# Patient Record
Sex: FEMALE | Race: WHITE | Employment: FULL TIME | URBAN - METROPOLITAN AREA
[De-identification: names, ages, dates, MRNs, and addresses within clinical notes are randomized per-mention and may not be internally consistent; named-entity substitution may affect disease eponyms.]

---

## 2024-04-11 ENCOUNTER — APPOINTMENT (OUTPATIENT)
Dept: LAB | Facility: HOSPITAL | Age: 44
End: 2024-04-11
Payer: COMMERCIAL

## 2024-04-11 ENCOUNTER — OFFICE VISIT (OUTPATIENT)
Dept: INTERNAL MEDICINE CLINIC | Facility: CLINIC | Age: 44
End: 2024-04-11
Payer: COMMERCIAL

## 2024-04-11 VITALS
BODY MASS INDEX: 31.99 KG/M2 | TEMPERATURE: 98.3 F | WEIGHT: 192 LBS | SYSTOLIC BLOOD PRESSURE: 100 MMHG | DIASTOLIC BLOOD PRESSURE: 70 MMHG | HEART RATE: 84 BPM | RESPIRATION RATE: 20 BRPM | OXYGEN SATURATION: 99 % | HEIGHT: 65 IN

## 2024-04-11 DIAGNOSIS — E66.9 CLASS 1 OBESITY WITHOUT SERIOUS COMORBIDITY WITH BODY MASS INDEX (BMI) OF 32.0 TO 32.9 IN ADULT, UNSPECIFIED OBESITY TYPE: ICD-10-CM

## 2024-04-11 DIAGNOSIS — E78.2 MIXED HYPERLIPIDEMIA: ICD-10-CM

## 2024-04-11 DIAGNOSIS — Z00.00 PHYSICAL EXAM, ANNUAL: Primary | ICD-10-CM

## 2024-04-11 DIAGNOSIS — E04.1 THYROID NODULE: ICD-10-CM

## 2024-04-11 DIAGNOSIS — R53.83 FATIGUE, UNSPECIFIED TYPE: ICD-10-CM

## 2024-04-11 DIAGNOSIS — Z12.4 SCREENING FOR CERVICAL CANCER: ICD-10-CM

## 2024-04-11 DIAGNOSIS — Z11.59 NEED FOR HEPATITIS C SCREENING TEST: ICD-10-CM

## 2024-04-11 DIAGNOSIS — R73.03 PREDIABETES: ICD-10-CM

## 2024-04-11 DIAGNOSIS — Z12.31 ENCOUNTER FOR SCREENING MAMMOGRAM FOR BREAST CANCER: ICD-10-CM

## 2024-04-11 DIAGNOSIS — Z11.4 SCREENING FOR HIV (HUMAN IMMUNODEFICIENCY VIRUS): ICD-10-CM

## 2024-04-11 DIAGNOSIS — Z00.00 ANNUAL PHYSICAL EXAM: ICD-10-CM

## 2024-04-11 PROBLEM — E66.811 CLASS 1 OBESITY WITHOUT SERIOUS COMORBIDITY WITH BODY MASS INDEX (BMI) OF 32.0 TO 32.9 IN ADULT: Status: ACTIVE | Noted: 2024-04-11

## 2024-04-11 LAB
25(OH)D3 SERPL-MCNC: 38 NG/ML (ref 30–100)
HCV AB SER QL: NORMAL
HIV 1+2 AB+HIV1 P24 AG SERPL QL IA: NORMAL
HIV 2 AB SERPL QL IA: NORMAL
HIV1 AB SERPL QL IA: NORMAL
HIV1 P24 AG SERPL QL IA: NORMAL
T4 FREE SERPL-MCNC: 0.92 NG/DL (ref 0.61–1.12)
TSH SERPL DL<=0.05 MIU/L-ACNC: 1.64 UIU/ML (ref 0.45–4.5)

## 2024-04-11 PROCEDURE — 82306 VITAMIN D 25 HYDROXY: CPT

## 2024-04-11 PROCEDURE — 84439 ASSAY OF FREE THYROXINE: CPT

## 2024-04-11 PROCEDURE — 87389 HIV-1 AG W/HIV-1&-2 AB AG IA: CPT

## 2024-04-11 PROCEDURE — 86803 HEPATITIS C AB TEST: CPT

## 2024-04-11 PROCEDURE — 84443 ASSAY THYROID STIM HORMONE: CPT

## 2024-04-11 PROCEDURE — 99386 PREV VISIT NEW AGE 40-64: CPT

## 2024-04-11 PROCEDURE — 36415 COLL VENOUS BLD VENIPUNCTURE: CPT

## 2024-04-11 RX ORDER — FENOFIBRATE 145 MG/1
145 TABLET, COATED ORAL DAILY
Qty: 30 TABLET | Refills: 1 | Status: SHIPPED | OUTPATIENT
Start: 2024-04-11 | End: 2024-06-10

## 2024-04-11 RX ORDER — FENOFIBRATE 145 MG/1
145 TABLET, COATED ORAL DAILY
COMMUNITY
End: 2024-04-11 | Stop reason: SDUPTHER

## 2024-04-11 NOTE — PROGRESS NOTES
ADULT ANNUAL PHYSICAL  [facility] INTERNAL MEDICINE [place]    NAME: Majo Frias  AGE: 43 y.o. SEX: female  : 1980     DATE: 2024     Assessment and Plan:     Problem List Items Addressed This Visit       Thyroid nodule     Patient describes remote history of right partial thyroidectomy due to large thyroid nodule.  She denies ever having to take thyroid hormone.  Previous blood work from 2023 shows normal TSH and free T4  Thyroid ultrasound 2023 shows 2 thyroid nodules with benign features, recommended repeat ultrasound in 1 year  No hypothyroid or hyperthyroid symptoms    Plan:  Will check thyroid ultrasound  Check TFTs         Relevant Orders    US thyroid    Class 1 obesity without serious comorbidity with body mass index (BMI) of 32.0 to 32.9 in adult    Relevant Medications    fenofibrate (TRICOR) 145 mg tablet    Other Relevant Orders    TSH, 3rd generation    T4, free    Ambulatory Referral to Weight Management    Vitamin D 25 hydroxy    Mixed hyperlipidemia     Last lipid panel 2023 shows elevated LDL, total cholesterol, and triglycerides  Triglycerides higher than previous labs  States that she started taking fenofibrate 6 months ago    Plan:  Will continue fenofibrate 145 mg daily for now  Repeat lipid panel at 1 year philip  Continue last modifications, weight management program         Relevant Medications    fenofibrate (TRICOR) 145 mg tablet    Prediabetes     A1c from 2023 shows 5.7, prediabetes  BMI 32  Patient wants a structured weight loss program, wants to avoid medicines    Plan:  Referred to weight management program  Recommend Meditarrenean diet         Annual physical exam     Patient here for annual physical exam and to establish care.  She is from [place] and had moved to [place], not relocating here in [place].  Previous mammograms and Pap smears have been normal according to the patient    Plan:  Ordered  mammogram  Refer to OB/GYN for Pap smear  Check HIV and hepatitis C screening labs         Physical exam, annual - Primary    Fatigue     Patient endorses having periods of fatigue, feeling down  PHQ 2 negative    Plan:  Will check TFTs and vitamin D         Relevant Orders    Vitamin D 25 hydroxy     Other Visit Diagnoses       Need for hepatitis C screening test        Relevant Orders    Hepatitis C Antibody    Screening for HIV (human immunodeficiency virus)        Relevant Orders    HIV 1/2 AG/AB w Reflex [facility] for 2 yr old and above    Screening for cervical cancer        Relevant Orders    Ambulatory Referral to Obstetrics / Gynecology    Encounter for screening mammogram for breast cancer        Relevant Orders    Mammo screening bilateral w 3d & cad            Immunizations and preventive care screenings were discussed with patient today. Appropriate education was printed on patient's after visit summary.    Counseling:  Alcohol/drug use: discussed moderation in alcohol intake, the recommendations for healthy alcohol use, and avoidance of illicit drug use.  Dental Health: discussed importance of regular tooth brushing, flossing, and dental visits.  Injury prevention: discussed safety/seat belts, safety helmets, smoke detectors, carbon dioxide detectors, and smoking near bedding or upholstery.  Sexual health: discussed sexually transmitted diseases, partner selection, use of condoms, avoidance of unintended pregnancy, and contraceptive alternatives.  Exercise: the importance of regular exercise/physical activity was discussed. Recommend exercise 3-5 times per week for at least 30 minutes.       Depression Screening and Follow-up Plan: Patient was screened for depression during today's encounter. They screened negative with a PHQ-2 score of 0.        Return in about 1 year (around 4/11/2025) for Annual physical.     Chief Complaint:     Chief Complaint   Patient presents with    [facility]     New pt       History of Present Illness:     Adult Annual Physical   Patient here for a comprehensive physical exam. The patient reports no problems.    Diet and Physical Activity  Diet/Nutrition: well balanced diet.   Exercise: 3-4 times a week on average.      Depression Screening  PHQ-2/9 Depression Screening    Little interest or pleasure in doing things: 0 - not at all  Feeling down, depressed, or hopeless: 0 - not at all  PHQ-2 Score: 0  PHQ-2 Interpretation: Negative depression screen       General Health  Sleep: gets 7-8 hours of sleep on average.   Hearing: normal - bilateral.  Vision: no vision problems.   Dental: regular dental visits.       /GYN Health  Follows with gynecology? no   Patient is: premenopausal  Last menstrual period: March 24  Contraceptive method: barrier methods.    Advanced Care Planning  Do you have an advanced directive? yes  Do you have a durable medical power of ? yes  ACP document given to the patient? no     Review of Systems:     Review of Systems   Constitutional:  Negative for chills and fever.   HENT:  Negative for ear pain and sore throat.    Eyes:  Negative for pain and visual disturbance.   Respiratory:  Negative for cough and shortness of breath.    Cardiovascular:  Negative for chest pain and palpitations.   Gastrointestinal:  Negative for abdominal pain and vomiting.   Genitourinary:  Negative for dysuria and hematuria.   Musculoskeletal:  Negative for arthralgias and back pain.   Skin:  Negative for color change and rash.   Neurological:  Negative for seizures and syncope.   All other systems reviewed and are negative.     Past Medical History:     No past medical history on file.   Past Surgical History:     No past surgical history on file.   Social History:     Social History     Socioeconomic History    Marital status: Single     Spouse name: None    Number of children: None    Years of education: None    Highest education level: None   Occupational History    None  "  Tobacco Use    Smoking status: Never     Passive exposure: Never    Smokeless tobacco: Never   Vaping Use    Vaping status: Never Used   Substance and Sexual Activity    Alcohol use: None    Drug use: None    Sexual activity: None   Other Topics Concern    None   Social History Narrative    None     Social Determinants of Health     Financial Resource Strain: Not on file   Food Insecurity: Not on file   Transportation Needs: Not on file   Physical Activity: Not on file   Stress: Not on file   Social Connections: Not on file   Intimate Partner Violence: Not on file   Housing Stability: Not on file      Family History:     No family history on file.   Current Medications:     Current Outpatient Medications   Medication Sig Dispense Refill    fenofibrate (TRICOR) 145 mg tablet Take 1 tablet (145 mg total) by mouth daily 30 tablet 1     No current facility-administered medications for this visit.      Allergies:     Allergies   Allergen Reactions    Penicillins Rash      Physical Exam:     /70 (BP Location: Right arm, Patient Position: Sitting, Cuff Size: Large)   Pulse 84   Temp 98.3 °F (36.8 °C)   Resp 20   Ht 5' 4.5\" (1.638 m)   Wt 87.1 kg (192 lb)   SpO2 99%   BMI 32.45 kg/m²     Physical Exam  Vitals and nursing note reviewed.   Constitutional:       General: She is not in acute distress.     Appearance: Normal appearance. She is well-developed. She is obese. She is not ill-appearing.   HENT:      Head: Normocephalic and atraumatic.   Eyes:      General: No scleral icterus.     Extraocular Movements: Extraocular movements intact.      Conjunctiva/sclera: Conjunctivae normal.      Pupils: Pupils are equal, round, and reactive to light.   Cardiovascular:      Rate and Rhythm: Normal rate and regular rhythm.      Pulses: Normal pulses.      Heart sounds: Normal heart sounds. No murmur heard.     No friction rub. No gallop.   Pulmonary:      Effort: Pulmonary effort is normal. No respiratory distress.    "   Breath sounds: Normal breath sounds. No wheezing or rales.   Abdominal:      General: There is no distension.      Palpations: Abdomen is soft.      Tenderness: There is no abdominal tenderness. There is no guarding.   Musculoskeletal:         General: No swelling.      Cervical back: Neck supple.      Right lower leg: No edema.      Left lower leg: No edema.   Skin:     General: Skin is warm and dry.      Capillary Refill: Capillary refill takes less than 2 seconds.   Neurological:      Mental Status: She is alert and oriented to person, place, and time. Mental status is at baseline.   Psychiatric:         Mood and Affect: Mood normal.         Behavior: Behavior normal.          Nutrition Assessment and Intervention:     Reviewed food recall journal    Recommended completion of food recall journal    Ordered nutritional assessment labs    New Nutrition Prescription completed with patient    Referral given to nutritionist or nutrition shared medical appointment    Reviewed and updated Nutrition Prescription      Other interventions: Eat 1 bowl leafy greens daily    Physical Activity Assessment and Intervention:    Activity journal reviewed    Activity journal completion recommended      Emotional and Mental Well-being, Sleep, Connectedness Assessment and Intervention:    Sleep/stress assessment performed    Depression and anxiety screening performed and reviewed    PHQ-9 or GAD7 performed for initial evaluation or follow-up      Tobacco and Toxic Substance Assessment and Intervention:     Tobacco use screening performed    Alcohol and drug use screening performed        Fransico Paez MD  [facility] INTERNAL MEDICINE [place]

## 2024-04-11 NOTE — ASSESSMENT & PLAN NOTE
Last lipid panel December 2023 shows elevated LDL, total cholesterol, and triglycerides  Triglycerides higher than previous labs  States that she started taking fenofibrate 6 months ago    Plan:  Will continue fenofibrate 145 mg daily for now  Repeat lipid panel at 1 year philip  Continue last modifications, weight management program

## 2024-04-11 NOTE — ASSESSMENT & PLAN NOTE
Patient describes remote history of right partial thyroidectomy due to large thyroid nodule.  She denies ever having to take thyroid hormone.  Previous blood work from December 2023 shows normal TSH and free T4  Thyroid ultrasound March 2023 shows 2 thyroid nodules with benign features, recommended repeat ultrasound in 1 year  No hypothyroid or hyperthyroid symptoms    Plan:  Will check thyroid ultrasound  Check TFTs

## 2024-04-11 NOTE — ASSESSMENT & PLAN NOTE
A1c from December 2023 shows 5.7, prediabetes  BMI 32  Patient wants a structured weight loss program, wants to avoid medicines    Plan:  Referred to weight management program  Recommend Meditarrenean diet

## 2024-04-11 NOTE — ASSESSMENT & PLAN NOTE
Patient here for annual physical exam and to establish care.  She is from [place] and had moved to [place], not relocating here in [place].  Previous mammograms and Pap smears have been normal according to the patient    Plan:  Ordered mammogram  Refer to OB/GYN for Pap smear  Check HIV and hepatitis C screening labs

## 2024-04-11 NOTE — ASSESSMENT & PLAN NOTE
Patient endorses having periods of fatigue, feeling down  PHQ 2 negative    Plan:  Will check TFTs and vitamin D

## 2024-04-16 ENCOUNTER — HOSPITAL ENCOUNTER (OUTPATIENT)
Dept: ULTRASOUND IMAGING | Facility: CLINIC | Age: 44
Discharge: HOME | End: 2024-04-16
Payer: COMMERCIAL

## 2024-04-16 DIAGNOSIS — E04.1 THYROID NODULE: ICD-10-CM

## 2024-04-16 PROCEDURE — 76536 US EXAM OF HEAD AND NECK: CPT

## 2024-04-17 ENCOUNTER — OFFICE VISIT (OUTPATIENT)
Dept: OBGYN CLINIC | Facility: CLINIC | Age: 44
End: 2024-04-17
Payer: COMMERCIAL

## 2024-04-17 VITALS
HEIGHT: 65 IN | WEIGHT: 191 LBS | SYSTOLIC BLOOD PRESSURE: 124 MMHG | DIASTOLIC BLOOD PRESSURE: 82 MMHG | BODY MASS INDEX: 31.82 KG/M2

## 2024-04-17 DIAGNOSIS — Z12.4 SCREENING FOR CERVICAL CANCER: ICD-10-CM

## 2024-04-17 DIAGNOSIS — Z01.419 ENCOUNTER FOR GYNECOLOGICAL EXAMINATION WITHOUT ABNORMAL FINDING: Primary | ICD-10-CM

## 2024-04-17 PROCEDURE — G0145 SCR C/V CYTO,THINLAYER,RESCR: HCPCS | Performed by: PHYSICIAN ASSISTANT

## 2024-04-17 PROCEDURE — G0476 HPV COMBO ASSAY CA SCREEN: HCPCS | Performed by: PHYSICIAN ASSISTANT

## 2024-04-17 PROCEDURE — S0610 ANNUAL GYNECOLOGICAL EXAMINA: HCPCS | Performed by: PHYSICIAN ASSISTANT

## 2024-04-17 NOTE — PROGRESS NOTES
ASSESSMENT & PLAN: Majo Frias is a 43 y.o.  with normal gynecologic exam.    1.  Routine well woman exam done today  2.  Pap and HPV:  The patient's last pap and hpv was unknown.    It was normal per patient.    Pap and cotesting was done today.    Current ASCCP Guidelines reviewed.   3.  Mammogram screening up-to-date with neck screening scheduled this month.  Colon cancer screening recommended to start age 45  4. The following were reviewed in today's visit: breast self exam, family planning choices, adequate intake of calcium and vitamin D, exercise, healthy diet, and age-appropriate recommendations regarding screenings and prevention.  5.  Patient expressed some concern regarding a shortened cycle with LMP.  This is the first time this has happened.  She states usually her cycle is about every 28 days however based on her documentation last cycle was about 21 days long.  She denies the bleeding being heavier or more painful than her baseline.  She otherwise is doing well and has no other significant symptoms.  History of partial thyroidectomy with thyroid blood work recently checked this month and was all normal.  At this time I do not feel any immediate further workup is needed.  However I would recommend she continue to track her cycles.  If she experiences any intermenstrual bleeding or more frequent and consistent bleeding or if her cycles become very erratic or heavier lengthier bleeding with her menstruation she needs to follow-up.    Patient expresses understanding and agreeable to plan.  RTO 1 year for annual exam, sooner if any problems arise in the interim or follow-up as needed regarding her menstrual cycle.    CC:  Annual Gynecologic Examination    HPI: Majo Frias is a 43 y.o.  who presents for annual gynecologic examination. She is a new patient with our office.     She has the following concerns: having LMP sooner than expected.  states she had period 3/24 which was around  the time as expected. Usually cycles about every 28 days, this one came 21 days. First time it has happened, denies prior problems of significant shorter or longer cycle. Denies heavier bleeding or more painful than normal.   Used to take OCP for contraception helped with moods.     She takes medication for hyperlipidemia.   Also hx of right partial thyroidectomy - recent thyroid US ordered by PCP w/ results pending.     Healthy diet Yes; well balanced; trying to drink more water  Vitamins Yes; glucosamine, MVI  Exercise Yes; 3 x a week    Patient's last menstrual period was 2024 (exact date).    Menses frequency: usually once a month, regular  Length of bleedin-6 days  Bleeding quality: moderate to average  Pain with menses: mild cramp  Denies intermenstrual bleeding.     Last Mammogram:  - reportedly normal. Next mammo screen 26  Last Colon Cancer Screening: N/A      Health Maintenance:      She does perform regular monthly self breast exams.  Denies breast pain, lump, skin change or nipple discharge.    She feels safe at home.     Patient Active Problem List   Diagnosis    Thyroid nodule    Class 1 obesity without serious comorbidity with body mass index (BMI) of 32.0 to 32.9 in adult    Mixed hyperlipidemia    Prediabetes    Annual physical exam    Physical exam, annual    Fatigue       Past Medical History:   Diagnosis Date    Mixed hyperlipidemia     Obesity (BMI 30.0-34.9)     Prediabetes     Thyroid nodule        Past Surgical History:   Procedure Laterality Date    THYROIDECTOMY, PARTIAL Right        Past OB/Gyn History:  OB History          0    Para   0    Term   0       0    AB   0    Living   0         SAB   0    IAB   0    Ectopic   0    Multiple   0    Live Births   0                  Pt has menstrual issues. As in HPI.  History of sexually transmitted infection: denies.  History of abnormal pap smears: denies .    Patient is not currently sexually active.  She has  not been sexually active in a year. The current method of family planning is condoms.    Family History   Problem Relation Age of Onset    Heart disease Mother     Stroke Mother     Hyperlipidemia Mother     Diabetes Mother     Heart disease Father     Stroke Father     Multiple sclerosis Sister     Breast cancer Paternal Cousin     Breast cancer Paternal Aunt        Family History of Breast/ Uterine/Ovarian/Colon Cancer:  as in HPI    Social History:  Social History     Socioeconomic History    Marital status: Single     Spouse name: Not on file    Number of children: Not on file    Years of education: Not on file    Highest education level: Not on file   Occupational History    Not on file   Tobacco Use    Smoking status: Never     Passive exposure: Never    Smokeless tobacco: Never   Vaping Use    Vaping status: Never Used   Substance and Sexual Activity    Alcohol use: Not Currently    Drug use: Never    Sexual activity: Not Currently   Other Topics Concern    Not on file   Social History Narrative    Not on file     Social Determinants of Health     Financial Resource Strain: Not on file   Food Insecurity: Not on file   Transportation Needs: Not on file   Physical Activity: Not on file   Stress: Not on file   Social Connections: Not on file   Intimate Partner Violence: Not on file   Housing Stability: Not on file     Allergies   Allergen Reactions    Penicillins Rash       Current Outpatient Medications:     fenofibrate (TRICOR) 145 mg tablet, Take 1 tablet (145 mg total) by mouth daily, Disp: 30 tablet, Rfl: 1    Review of Systems:    Review of Systems  Constitutional :no fever, feels well, no tiredness, no recent weight gain or loss  ENT: no ear ache, no loss of hearing, no nosebleeds or nasal discharge, no sore throat or hoarseness.  Cardiovascular: no complaints of slow or fast heart beat, no chest pain, no palpitations, no leg claudication or lower extremity edema.  Respiratory: no complaints of shortness  "of shortness of breath, no MORTON  Breasts:no complaints of breast pain, breast lump, or nipple discharge  Gastrointestinal: no complaints of abdominal pain, constipation, nausea, vomiting, or diarrhea or bloody stools  Genitourinary : shorter cycle with LMP (21 days), first time this has happened. no complaints of dysuria, incontinence, pelvic pain, no dysmenorrhea, vaginal discharge/itch/odor or intermenstrual vaginal bleeding Musculoskeletal: no complaints of arthralgia, no myalgia, no joint swelling or stiffness, no limb pain or swelling.  Integumentary: no complaints of skin rash or lesion, itching or dry skin  Neurological: no complaints of headache, no confusion, no numbness or tingling, no dizziness or fainting  Mental health: denies anxiety, depression or SI    Objective      /82 (BP Location: Left arm, Patient Position: Sitting, Cuff Size: Adult)   Ht 5' 4.5\" (1.638 m)   Wt 86.6 kg (191 lb)   LMP 04/14/2024 (Exact Date)   BMI 32.28 kg/m²     General:   appears stated age, cooperative, alert normal mood and affect.  BMI 32.28   Neck: normal, supple,trachea midline, no masses   Heart: regular rate and rhythm, S1, S2 normal, no murmur, click, rub or gallop   Lungs: clear to auscultation bilaterally   Breasts: normal appearance, no masses or tenderness, No nipple retraction or dimpling, No nipple discharge or bleeding, No axillary or supraclavicular adenopathy, Normal to palpation without dominant masses   Abdomen: soft, non-tender, without masses or organomegaly   Vulva: normal female genitalia, no lesions   Vagina: normal vagina, no discharge, exudate, lesion, or erythema   Urethra: normal   Cervix: Normal, no discharge. PAP done. Nontender.   Uterus: normal size, contour, position, consistency, mobility, non-tender   Adnexa: no mass, fullness, tenderness   Lymphatic palpation of lymph nodes in neck, axilla, groin and/or other locations: no lymphadenopathy or masses noted   Skin normal skin turgor and " no rashes.   Psychiatric orientation to person, place, and time: normal. mood and affect: normal

## 2024-04-18 LAB
HPV HR 12 DNA CVX QL NAA+PROBE: NEGATIVE
HPV16 DNA CVX QL NAA+PROBE: NEGATIVE
HPV18 DNA CVX QL NAA+PROBE: NEGATIVE

## 2024-04-19 ENCOUNTER — TELEPHONE (OUTPATIENT)
Dept: FAMILY MEDICINE CLINIC | Facility: CLINIC | Age: 44
End: 2024-04-19

## 2024-04-22 ENCOUNTER — HOSPITAL ENCOUNTER (OUTPATIENT)
Dept: MAMMOGRAPHY | Facility: CLINIC | Age: 44
Discharge: HOME | End: 2024-04-22
Payer: COMMERCIAL

## 2024-04-22 ENCOUNTER — OFFICE VISIT (OUTPATIENT)
Dept: INTERNAL MEDICINE CLINIC | Facility: CLINIC | Age: 44
End: 2024-04-22
Payer: COMMERCIAL

## 2024-04-22 VITALS
TEMPERATURE: 98.7 F | SYSTOLIC BLOOD PRESSURE: 117 MMHG | BODY MASS INDEX: 31.82 KG/M2 | HEIGHT: 65 IN | DIASTOLIC BLOOD PRESSURE: 67 MMHG | RESPIRATION RATE: 20 BRPM | OXYGEN SATURATION: 96 % | HEART RATE: 80 BPM | WEIGHT: 191 LBS

## 2024-04-22 DIAGNOSIS — M25.511 RIGHT SHOULDER PAIN, UNSPECIFIED CHRONICITY: Primary | ICD-10-CM

## 2024-04-22 DIAGNOSIS — E66.9 CLASS 1 OBESITY WITHOUT SERIOUS COMORBIDITY WITH BODY MASS INDEX (BMI) OF 32.0 TO 32.9 IN ADULT, UNSPECIFIED OBESITY TYPE: ICD-10-CM

## 2024-04-22 DIAGNOSIS — E78.2 MIXED HYPERLIPIDEMIA: ICD-10-CM

## 2024-04-22 DIAGNOSIS — R73.03 PREDIABETES: ICD-10-CM

## 2024-04-22 DIAGNOSIS — Z12.31 ENCOUNTER FOR SCREENING MAMMOGRAM FOR BREAST CANCER: ICD-10-CM

## 2024-04-22 PROCEDURE — 77067 SCR MAMMO BI INCL CAD: CPT

## 2024-04-22 PROCEDURE — 77063 BREAST TOMOSYNTHESIS BI: CPT

## 2024-04-22 PROCEDURE — 99213 OFFICE O/P EST LOW 20 MIN: CPT

## 2024-04-22 RX ORDER — METHOCARBAMOL 500 MG/1
500 TABLET, FILM COATED ORAL 4 TIMES DAILY
Qty: 56 TABLET | Refills: 0 | Status: SHIPPED | OUTPATIENT
Start: 2024-04-22 | End: 2024-05-06

## 2024-04-22 NOTE — PROGRESS NOTES
"Name: Majo Frias      : 1980      MRN: [id number]  Encounter Provider: Jose Blake MD  Encounter Date: 2024   Encounter department: [facility] INTERNAL MEDICINE [place]    Assessment & Plan     1. Mixed hyperlipidemia  Assessment & Plan:  No results found for: \"CHOLESTEROL\", \"TRIG\", \"HDL\", \"LDLCALC\"  Previous note listed LIPID PANEL DEDC23 having elevated LDL, total cholesterol, and triglycerides   Patient currently taking fenofibrate 145mg PO Daily     Plan:  Continue fenofibrate 145mg PO Daily  Continue lifestyle modification   Repeat lipid panel  as not on file to help calculate ASCVD risk and make appropriate decision on satatin therapy         2. Right shoulder pain, unspecified chronicity    3. Class 1 obesity without serious comorbidity with body mass index (BMI) of 32.0 to 32.9 in adult, unspecified obesity type  Assessment & Plan:  Lab Results   Component Value Date    [id number] 1.644 2024    FREET4 0.92 2024     Vit d wnl  Reviewed health labs associated with obesity ordered at previous visit  PLAN  Life style modifications  Eat real food, not too much, mostly plants  Avoid processed foods  DASH Diet  Limit salt intake: education given on how to read nutriton labels to find salt (ie listed as Salt, NaCl, Sodium). Pt was educated on types of foods and drinks with high salt content.   Do not drink your calories  Eat a piece of fruit or vegetable 30 mins prior to meals  Avoid sugar and sugar substitutes  Limit meat products  Per AHA guidelines, recommend moderate-vigorous intensity exercise for 30 minutes a day for 5 days a week or a total of 150 min/week  Keep exercise journal  Small changes in activity makes a big difference ie take stairs instead of elevators or park farther away from you destination  Try to weigh yourself daily at same time of day and keep journal  Keep food journal   Sleep hygine  Mindful meditation for 15mins a day 5 days a week. Insight " "timer a good free júnior.         4. Prediabetes  Assessment & Plan:  DEC23 A1c 5.7  Pt previously inquired about weight loss program and was referred to weight management program ot previous visit  PLAN:  Continue with weight management program  As above in obesity section               Subjective      Pt presented to the clinic due to right shoulder pain.   Pt notes pain started 01MAR24. Pt was lifting weights and suddenly couldn't lift the weights completely. Then patient was taking a shower and felt that they could not lift the shoulder at all.  Pt notes since then pain has been present for a month and a half now with no resolution. Pt also notes she can't hold her phone at work and type (phone on shoulder with head bent.   Pt notes the problem is with abduction and extension.  Pt notes during a massage it hurt her right shoulder.   Pain is alleviated by NSIADS and aggravated  by weights, holding phon on shoulder, and massage  Pain is 4 out of 10 when on Asprin and 8/10 without Asprin   Pain described as sharp.           Review of Systems   Constitutional:  Negative for fever.   HENT: Negative.     Eyes: Negative.    Respiratory: Negative.     Cardiovascular: Negative.    Gastrointestinal: Negative.    Genitourinary: Negative.    Musculoskeletal:  Positive for myalgias.   Skin: Negative.    Neurological:  Negative for numbness.       Current Outpatient Medications on File Prior to Visit   Medication Sig    fenofibrate (TRICOR) 145 mg tablet Take 1 tablet (145 mg total) by mouth daily       Objective     /67 (BP Location: Left arm, Patient Position: Sitting, Cuff Size: Large)   Pulse 80   Temp 98.7 °F (37.1 °C)   Resp 20   Ht 5' 5\" (1.651 m)   Wt 86.6 kg (191 lb)   LMP 04/14/2024 (Exact Date)   SpO2 96%   BMI 31.78 kg/m²     Physical Exam  Constitutional:       Appearance: Normal appearance.   HENT:      Head: Normocephalic.      Mouth/Throat:      Mouth: Mucous membranes are moist.      Pharynx: " Oropharynx is clear.   Eyes:      Conjunctiva/sclera: Conjunctivae normal.   Cardiovascular:      Rate and Rhythm: Normal rate and regular rhythm.   Pulmonary:      Effort: Pulmonary effort is normal. No respiratory distress.      Breath sounds: Normal breath sounds. No wheezing, rhonchi or rales.   Musculoskeletal:         General: Tenderness present.      Cervical back: Normal range of motion and neck supple. No rigidity or tenderness.      Comments: Right shoulder tenderness on palpation of the upper trapezius   No pain on passive motion  Pain on active shoulder extension  No pain on shoulder extension against resistance.    Lymphadenopathy:      Cervical: No cervical adenopathy.   Neurological:      Mental Status: She is alert.     Nutrition Assessment and Intervention:     Reviewed and updated Nutrition Prescription      Physical Activity Assessment and Intervention:    Physical Activity Prescription completed with patient         Jose Blake MD

## 2024-04-22 NOTE — ASSESSMENT & PLAN NOTE
"No results found for: \"CHOLESTEROL\", \"TRIG\", \"HDL\", \"LDLCALC\"  Previous note listed LIPID PANEL DEDC23 having elevated LDL, total cholesterol, and triglycerides   Patient currently taking fenofibrate 145mg PO Daily     Plan:  Continue fenofibrate 145mg PO Daily  Continue lifestyle modification   Repeat lipid panel in 6 months as not on file to help calculate ASCVD risk and make appropriate decision on satatin therapy     "

## 2024-04-22 NOTE — ASSESSMENT & PLAN NOTE
DEC23 A1c 5.7  Pt previously inquired about weight loss program and was referred to weight management program ot previous visit  PLAN:  Continue with weight management program  As above in obesity section

## 2024-04-22 NOTE — ASSESSMENT & PLAN NOTE
Lab Results   Component Value Date    [id number] 1.644 04/11/2024    FREET4 0.92 04/11/2024     Vit d wnl  Reviewed health labs associated with obesity ordered at previous visit  PLAN  Life style modifications  Eat real food, not too much, mostly plants  Avoid processed foods  DASH Diet  Limit salt intake: education given on how to read nutriton labels to find salt (ie listed as Salt, NaCl, Sodium). Pt was educated on types of foods and drinks with high salt content.   Do not drink your calories  Eat a piece of fruit or vegetable 30 mins prior to meals  Avoid sugar and sugar substitutes  Limit meat products  Per AHA guidelines, recommend moderate-vigorous intensity exercise for 30 minutes a day for 5 days a week or a total of 150 min/week  Keep exercise journal  Small changes in activity makes a big difference ie take stairs instead of elevators or park farther away from you destination  Try to weigh yourself daily at same time of day and keep journal  Keep food journal   Sleep hygine  Mindful meditation for 15mins a day 5 days a week. Insight timer a good free júnior.

## 2024-04-24 ENCOUNTER — TELEPHONE (OUTPATIENT)
Age: 44
End: 2024-04-24

## 2024-04-24 LAB
LAB AP GYN PRIMARY INTERPRETATION: NORMAL
Lab: NORMAL

## 2024-04-24 NOTE — TELEPHONE ENCOUNTER
Tiana, from [employer], called stating that the pts MRI order is not approved and pending denial.    Please call [phone] d/t additional information being needed.

## 2024-04-30 DIAGNOSIS — M25.511 RIGHT SHOULDER PAIN, UNSPECIFIED CHRONICITY: Primary | ICD-10-CM

## 2024-04-30 NOTE — TELEPHONE ENCOUNTER
I left message regarding MRI cancellation and needed schedule for PT. Patient is to call back and let us know if she scheduled with physical therapy, did she need assist to schedule and notify of regular x-ray pending.

## 2024-05-01 NOTE — TELEPHONE ENCOUNTER
Carl from [facility] [employer] calling regarding MRI denial.    Patient does not meet criteria to have MRI performed.     May contact for a peer to peer at [phone].    Please review.  Thank You.

## 2024-06-20 DIAGNOSIS — E66.9 CLASS 1 OBESITY WITHOUT SERIOUS COMORBIDITY WITH BODY MASS INDEX (BMI) OF 32.0 TO 32.9 IN ADULT, UNSPECIFIED OBESITY TYPE: ICD-10-CM

## 2024-06-20 RX ORDER — FENOFIBRATE 145 MG/1
145 TABLET, COATED ORAL DAILY
Qty: 30 TABLET | Refills: 1 | Status: SHIPPED | OUTPATIENT
Start: 2024-06-20 | End: 2024-08-19

## 2024-07-02 ENCOUNTER — OFFICE VISIT (OUTPATIENT)
Dept: BARIATRICS | Facility: CLINIC | Age: 44
End: 2024-07-02
Payer: COMMERCIAL

## 2024-07-02 VITALS
SYSTOLIC BLOOD PRESSURE: 100 MMHG | BODY MASS INDEX: 31.75 KG/M2 | HEIGHT: 65 IN | WEIGHT: 190.6 LBS | DIASTOLIC BLOOD PRESSURE: 64 MMHG | HEART RATE: 74 BPM

## 2024-07-02 DIAGNOSIS — E04.1 THYROID NODULE: Primary | ICD-10-CM

## 2024-07-02 DIAGNOSIS — E66.9 CLASS 1 OBESITY WITHOUT SERIOUS COMORBIDITY WITH BODY MASS INDEX (BMI) OF 32.0 TO 32.9 IN ADULT, UNSPECIFIED OBESITY TYPE: ICD-10-CM

## 2024-07-02 DIAGNOSIS — R73.03 PREDIABETES: ICD-10-CM

## 2024-07-02 PROCEDURE — 99204 OFFICE O/P NEW MOD 45 MIN: CPT | Performed by: NURSE PRACTITIONER

## 2024-07-02 RX ORDER — MULTIVITAMIN
1 CAPSULE ORAL DAILY
COMMUNITY

## 2024-07-02 NOTE — PROGRESS NOTES
Assessment/Plan:    Class 1 obesity without serious comorbidity with body mass index (BMI) of 32.0 to 32.9 in adult  - Discussed options of HealthyCORE-Intensive Lifestyle Intervention Program, Very Low Calorie Diet-VLCD, and Conservative Program and the role of weight loss medications.  - Patient is interested in pursuing Conservative Program and follow up visits with medical weight management provider.  - Explained the importance of making lifestyle changes in addition to starting any anti-obesity medications.   - Initial weight loss goal of 5-10% weight loss for improved health. Weight loss can improve patient's co-morbid conditions and/or prevent weight-related complications.  - Weight is not at goal and patient has been unable to achieve a meaningful weight loss above 5% using various programs and tools for more than 6 months  - Labs reviewed from 4/2024    General Recommendations:  Nutrition:  Eat breakfast daily.  Do not skip meals.      Food log (ie.) www.[employer].com, sparkpeople.com, loseit.com, calorieking.com, etc.     Practice mindful eating.  Be sure to set aside time to eat, eat slowly, and savor your food.     Hydration:    At least 64oz of water daily.  No sugar sweetened beverages.  No juice (eat the fruit instead).     Exercise:  Studies have shown that the ideal exercise goal is somewhere between 150 to 300 minutes of moderate intensity exercise a week.  Start with exercising 10 minutes every other day and gradually increase physical activity with a goal of at least 150 minutes of moderate intensity exercise a week, divided over at least 3 days a week.  An example of this would be exercising 30 minutes a day, 5 days a week.  Resistance training can increase muscle mass and increase our resting metabolic rate.   FULL BODY resistance training is recommended 2-3 times a week.  Do not do this on consecutive days to allow for muscle recovery.     Aim for a bare minimum 5000 steps, even on days you  do not exercise.     Monitoring:   Weigh yourself daily.  If this causes undue stress, then just weigh yourself once a week.  Weigh yourself the same time of the day with the same amount of clothing on.  Preferably this should be done after waking up, before you eat, and with no clothing or minimal clothing on.     Specific Goals:  Patient labs reviewed and barriers to weight loss were addressed today.  Nutrition was discussed patient will start tracking her food and her calories again daily.  She will try to evenly balance her calories throughout the day to avoid eating large portions with each meal and late in the evening.  Activity was discussed and she will continue to try to work out at least 3 times a week for 30 minutes doing both strength training and cardiovascular exercise.  Medications were discussed:  Phentermine was discussed to help with appetite suppression  Topiramate was discussed and patient denies any history of kidney stones  Bupropion/naltrexone was discussed and patient denies any history of seizures  GLP-1 medications were discussed as they may help with blood sugar control as well as appetite suppression and portion control.  Patient will inquire with her insurance company if they are covered and may consider Zepbound or Wegovy.    Thyroid nodule  Thyroid labs and thyroid US was stable in 4/2024    Prediabetes  Weight loss and diet changes will likely help blood sugar control  May consider metformin or GLP-1 medication         Majo was seen today for consult.    Diagnoses and all orders for this visit:    Thyroid nodule    Class 1 obesity without serious comorbidity with body mass index (BMI) of 32.0 to 32.9 in adult, unspecified obesity type  -     Ambulatory Referral to Weight Management    Prediabetes       Patient denies personal history of pancreatitis. Patient also denies personal and family history of medullary thyroid cancer and multiple endocrine neoplasia type 2 (MEN 2 tumor).  "Patient denies any history of kidney stones, seizures, or glaucoma.       Total time spent reviewing chart, interviewing patient, examining patient, discussing plan, answering all questions, and documentin min, with >50% face-to-face time spent counseling patient on nonsurgical interventions for the treatment of excess weight. Discussed in detail nonsurgical options including intensive lifestyle intervention program, very low-calorie diet program and conservative program.  Discussed the role of weight loss medications.  Counseled patient on diet behavior and exercise modification for weight loss.    Follow up in approximately 3 months with Non-Surgical Physician/Advanced Practitioner.    Subjective:   Chief Complaint   Patient presents with    Consult     Pt is heref for MWM consult. Waist - 36\"       Patient ID: Majo Frias  is a 43 y.o. female with excess weight/obesity here to pursue weight management.  Previous notes and records have been reviewed.    Past Medical History:   Diagnosis Date    Mixed hyperlipidemia     Obesity (BMI 30.0-34.9)     Prediabetes     Thyroid nodule      Past Surgical History:   Procedure Laterality Date    THYROIDECTOMY, PARTIAL Right        HPI:  Wt Readings from Last 20 Encounters:   24 86.5 kg (190 lb 9.6 oz)   24 86.6 kg (191 lb)   24 86.6 kg (191 lb)   24 87.1 kg (192 lb)       Patient presents today to medical weight management office for consult.  Patient has been struggling with her weight for quite a few years.  She has a very hectic life working for her [employer] and moving often.  She states her weight started to increase when she had her thyroid surgery and got worse with stress throughout the years.  In the past she was able to lose weight with diet and exercise, she even went to a metabolism center and was able to lose some weight.  She seems to be only able to lose 10 to 12 pounds and her weight loss will plateau.  She is concerned as her " mother does have diabetes and she is hoping to avoid diabetes.  She has most recently been participating with the [employer] júnior and exercising 3 times a week but has not seen much weight loss.      Obesity/Excess Weight:  Severity: Mild  Onset:  last 5-7 years  Modifiers: Diet and Exercise and Commercial Weight Loss Programs-ie. Weight Watchers, Nicole Tristan, Nutrisystem, etc.  Contributing factors: Poor Food Choices, Insufficient Physical Activity, Stress/Emotional Eating, Binge Eating, Lack of knowledge of appropriate lifestyle changes, and Insufficient time to make appropriate lifestyle changes  Associated symptoms: comorbid conditions, fatigue, body image issues, decreased self esteem, increased shortness of breath, inability to do certain activities, and clothes do not fit    Diet recall:  Dairy free  B: eggs with millard and crackers and 1/2 OJ  S: fruit  L: salad with chicken salad and italian dressing OR boiled cod fish with casava OR cauliflower cheese pizza  S: nuts OR pretzels   D: chicken and rice with vegetables  S: popcorn    Hydration: soda stream, lemon water, sweetened iced tea  Alcohol: very rarely  Smoking: no  Exercise: 3 times a week - strength and cardio  Occupation: works in [employer]  Sleep: well  STOP ban8    Current weight: 190.6 lbs  Current BMI: 32.21  Current Waist Measurement: 36 in  Goal weight: 160 lbs      Mammogram: 2024    The following portions of the patient's history were reviewed and updated as appropriate: allergies, current medications, past family history, past medical history, past social history, past surgical history, and problem list.    Family History   Problem Relation Age of Onset    Heart disease Mother     Stroke Mother     Hyperlipidemia Mother     Diabetes Mother     Heart disease Father     Stroke Father     Multiple sclerosis Sister     Breast cancer Paternal Aunt     Breast cancer Paternal Cousin         Review of Systems   Constitutional:  Negative for  "fatigue.   HENT:  Negative for sore throat.    Respiratory:  Positive for shortness of breath. Negative for cough.    Cardiovascular:  Negative for chest pain, palpitations and leg swelling.   Gastrointestinal:  Negative for abdominal pain, constipation, diarrhea and nausea.   Genitourinary:  Negative for dysuria.   Musculoskeletal:  Negative for arthralgias and back pain.   Skin:  Negative for rash.   Neurological:  Negative for headaches.   Psychiatric/Behavioral:  Negative for dysphoric mood. The patient is not nervous/anxious.        Objective:  /64 (BP Location: Left arm, Patient Position: Sitting, Cuff Size: Large)   Pulse 74   Ht 5' 4.5\" (1.638 m)   Wt 86.5 kg (190 lb 9.6 oz)   LMP 06/25/2024 (Exact Date)   BMI 32.21 kg/m²     Physical Exam  Vitals and nursing note reviewed.   Constitutional:       Appearance: Normal appearance. She is obese.   HENT:      Head: Normocephalic.   Pulmonary:      Effort: Pulmonary effort is normal.   Neurological:      General: No focal deficit present.      Mental Status: She is alert and oriented to person, place, and time.   Psychiatric:         Mood and Affect: Mood normal.         Behavior: Behavior normal.         Thought Content: Thought content normal.         Judgment: Judgment normal.              Labs and Imaging  Recent labs and imaging have been personally reviewed.  No results found for: \"WBC\", \"HGB\", \"HCT\", \"MCV\", \"PLT\"  No results found for: \"NA\", \"SODIUM\", \"K\", \"CL\", \"CO2\", \"ANIONGAP\", \"AGAP\", \"BUN\", \"CREATININE\", \"GLUC\", \"GLUF\", \"CALCIUM\", \"AST\", \"ALT\", \"ALKPHOS\", \"PROT\", \"TP\", \"BILITOT\", \"TBILI\", \"EGFR\"  No results found for: \"HGBA1C\"  Lab Results   Component Value Date    [id number] 1.644 04/11/2024     No results found for: \"CHOLESTEROL\"  No results found for: \"HDL\"  No results found for: \"TRIG\"  No results found for: \"LDLCALC\"  "

## 2024-07-08 NOTE — ASSESSMENT & PLAN NOTE
- Discussed options of HealthyCORE-Intensive Lifestyle Intervention Program, Very Low Calorie Diet-VLCD, and Conservative Program and the role of weight loss medications.  - Patient is interested in pursuing Conservative Program and follow up visits with medical weight management provider.  - Explained the importance of making lifestyle changes in addition to starting any anti-obesity medications.   - Initial weight loss goal of 5-10% weight loss for improved health. Weight loss can improve patient's co-morbid conditions and/or prevent weight-related complications.  - Weight is not at goal and patient has been unable to achieve a meaningful weight loss above 5% using various programs and tools for more than 6 months  - Labs reviewed from 4/2024    General Recommendations:  Nutrition:  Eat breakfast daily.  Do not skip meals.      Food log (ie.) www.myfitnesspal.com, sparkpeople.com, loseit.com, calorieking.com, etc.     Practice mindful eating.  Be sure to set aside time to eat, eat slowly, and savor your food.     Hydration:    At least 64oz of water daily.  No sugar sweetened beverages.  No juice (eat the fruit instead).     Exercise:  Studies have shown that the ideal exercise goal is somewhere between 150 to 300 minutes of moderate intensity exercise a week.  Start with exercising 10 minutes every other day and gradually increase physical activity with a goal of at least 150 minutes of moderate intensity exercise a week, divided over at least 3 days a week.  An example of this would be exercising 30 minutes a day, 5 days a week.  Resistance training can increase muscle mass and increase our resting metabolic rate.   FULL BODY resistance training is recommended 2-3 times a week.  Do not do this on consecutive days to allow for muscle recovery.     Aim for a bare minimum 5000 steps, even on days you do not exercise.     Monitoring:   Weigh yourself daily.  If this causes undue stress, then just weigh yourself once  a week.  Weigh yourself the same time of the day with the same amount of clothing on.  Preferably this should be done after waking up, before you eat, and with no clothing or minimal clothing on.     Specific Goals:  Patient labs reviewed and barriers to weight loss were addressed today.  Nutrition was discussed patient will start tracking her food and her calories again daily.  She will try to evenly balance her calories throughout the day to avoid eating large portions with each meal and late in the evening.  Activity was discussed and she will continue to try to work out at least 3 times a week for 30 minutes doing both strength training and cardiovascular exercise.  Medications were discussed:  Phentermine was discussed to help with appetite suppression  Topiramate was discussed and patient denies any history of kidney stones  Bupropion/naltrexone was discussed and patient denies any history of seizures  GLP-1 medications were discussed as they may help with blood sugar control as well as appetite suppression and portion control.  Patient will inquire with her insurance company if they are covered and may consider Zepbound or Wegovy.

## 2024-07-08 NOTE — ASSESSMENT & PLAN NOTE
Weight loss and diet changes will likely help blood sugar control  May consider metformin or GLP-1 medication

## 2024-07-23 ENCOUNTER — HOSPITAL ENCOUNTER (OUTPATIENT)
Dept: RADIOLOGY | Facility: HOSPITAL | Age: 44
Discharge: HOME | End: 2024-07-23
Payer: COMMERCIAL

## 2024-07-23 ENCOUNTER — APPOINTMENT (OUTPATIENT)
Dept: LAB | Facility: HOSPITAL | Age: 44
End: 2024-07-23
Payer: COMMERCIAL

## 2024-07-23 ENCOUNTER — TELEPHONE (OUTPATIENT)
Age: 44
End: 2024-07-23

## 2024-07-23 ENCOUNTER — OFFICE VISIT (OUTPATIENT)
Dept: INTERNAL MEDICINE CLINIC | Facility: CLINIC | Age: 44
End: 2024-07-23

## 2024-07-23 VITALS
OXYGEN SATURATION: 98 % | BODY MASS INDEX: 32.61 KG/M2 | SYSTOLIC BLOOD PRESSURE: 108 MMHG | RESPIRATION RATE: 14 BRPM | WEIGHT: 191 LBS | DIASTOLIC BLOOD PRESSURE: 60 MMHG | HEIGHT: 64 IN | TEMPERATURE: 98.1 F | HEART RATE: 80 BPM

## 2024-07-23 DIAGNOSIS — M25.511 RIGHT SHOULDER PAIN, UNSPECIFIED CHRONICITY: ICD-10-CM

## 2024-07-23 DIAGNOSIS — E78.2 MIXED HYPERLIPIDEMIA: Primary | ICD-10-CM

## 2024-07-23 DIAGNOSIS — E78.2 MIXED HYPERLIPIDEMIA: ICD-10-CM

## 2024-07-23 LAB
CHOLEST SERPL-MCNC: 216 MG/DL
HDLC SERPL-MCNC: 57 MG/DL
LDLC SERPL CALC-MCNC: 145 MG/DL (ref 0–100)
TRIGL SERPL-MCNC: 68 MG/DL

## 2024-07-23 PROCEDURE — 80061 LIPID PANEL: CPT

## 2024-07-23 PROCEDURE — 36415 COLL VENOUS BLD VENIPUNCTURE: CPT

## 2024-07-23 PROCEDURE — 73030 X-RAY EXAM OF SHOULDER: CPT

## 2024-07-23 NOTE — ASSESSMENT & PLAN NOTE
Patient presenting with continued right shoulder pain worse with abduction, internal rotation.  This is limiting her ability to exercise.  Worse with resistance training.  She was unable to see orthopedic surgery because she needs a referral and was unable to do MRI because it was denied by insurance  She states that NSAIDs relieve her pain    Plan:  Will pursue right shoulder x-ray  Referral to physical therapy  Referral to orthopedic surgery  If x-ray unremarkable, and patient tries physical therapy for 6 sessions with no improvement, will pursue MRI of the right shoulder

## 2024-07-23 NOTE — PROGRESS NOTES
INTERNAL MEDICINE FOLLOW-UP OFFICE VISIT  [facility] INTERNAL MEDICINE [place]    NAME: Majo Frias  AGE: 43 y.o. SEX: female  : 1980     DATE: 2024     Assessment and Plan:     1. Mixed hyperlipidemia  Assessment & Plan:  Patient has been on fenofibrate 145 mg daily  Last lipid panel 2023 showing elevated triglycerides  We will recheck another lipid panel to determine need for increasing fenofibrate dosage versus starting statin therapy  Orders:  -     Lipid Panel with Direct LDL reflex; Future  2. Right shoulder pain, unspecified chronicity  Assessment & Plan:  Patient presenting with continued right shoulder pain worse with abduction, internal rotation.  This is limiting her ability to exercise.  Worse with resistance training.  She was unable to see orthopedic surgery because she needs a referral and was unable to do MRI because it was denied by insurance  She states that NSAIDs relieve her pain    Plan:  Will pursue right shoulder x-ray  Referral to physical therapy  Referral to orthopedic surgery  If x-ray unremarkable, and patient tries physical therapy for 6 sessions with no improvement, will pursue MRI of the right shoulder  Orders:  -     XR shoulder 2+ vw right; Future; Expected date: 2024  -     Ambulatory Referral to Orthopedic Surgery; Future  -     Ambulatory Referral to Physical Therapy; Future      Return in about 3 months (around 10/23/2024).     Chief Complaint:     Chief Complaint   Patient presents with    Shoulder Pain     Several months        History of Present Illness:     Majo Frias is a 43-year-old female with past medical history of prediabetes, hyperlipidemia, and thyroid nodule presented to the clinic for follow-up.  Patient states that she has been having right shoulder pain since 2024, which is worse with exertion, abduction, internal rotation.  She has been trying to exercise in order to lose more weight but is  "limited by her right shoulder pain.  She states that she tried to go see an orthopedic surgeon but was told to go to her PCP first.  At the last visit she was ordered an MRI of the right shoulder but it looks like it was denied by her insurance.  She denies any other complaints.    The following portions of the patient's history were reviewed and updated as appropriate: allergies, current medications, past family history, past medical history, past social history, past surgical history and problem list.     Review of Systems:     Review of Systems   Constitutional:  Negative for chills and fever.   HENT:  Negative for ear pain and sore throat.    Eyes:  Negative for pain and visual disturbance.   Respiratory:  Negative for cough and shortness of breath.    Cardiovascular:  Negative for chest pain and palpitations.   Gastrointestinal:  Negative for abdominal pain and vomiting.   Genitourinary:  Negative for dysuria and hematuria.   Musculoskeletal:  Positive for arthralgias. Negative for back pain.   Skin:  Negative for color change and rash.   Neurological:  Negative for seizures and syncope.   All other systems reviewed and are negative.       Problem List:     Patient Active Problem List   Diagnosis    Thyroid nodule    Class 1 obesity without serious comorbidity with body mass index (BMI) of 32.0 to 32.9 in adult    Mixed hyperlipidemia    Prediabetes    Annual physical exam    Physical exam, annual    Fatigue    Right shoulder pain        Objective:     /60 (BP Location: Left arm, Patient Position: Sitting, Cuff Size: Large)   Pulse 80   Temp 98.1 °F (36.7 °C) (Tympanic)   Resp 14   Ht 5' 4\" (1.626 m)   Wt 86.6 kg (191 lb)   LMP 06/25/2024 (Exact Date)   SpO2 98%   BMI 32.79 kg/m²     Physical Exam  Vitals and nursing note reviewed.   Constitutional:       General: She is not in acute distress.     Appearance: Normal appearance. She is well-developed. She is not ill-appearing.   HENT:      Head: " "Normocephalic and atraumatic.   Eyes:      General: No scleral icterus.     Extraocular Movements: Extraocular movements intact.      Conjunctiva/sclera: Conjunctivae normal.      Pupils: Pupils are equal, round, and reactive to light.   Cardiovascular:      Rate and Rhythm: Normal rate and regular rhythm.      Pulses: Normal pulses.      Heart sounds: Normal heart sounds. No murmur heard.     No friction rub. No gallop.   Pulmonary:      Effort: Pulmonary effort is normal. No respiratory distress.      Breath sounds: Normal breath sounds. No wheezing or rales.   Abdominal:      General: There is no distension.      Palpations: Abdomen is soft.      Tenderness: There is no abdominal tenderness. There is no guarding.   Musculoskeletal:         General: Tenderness (right shoulder, reproducible at the acromio-clavicular joint with ABduction, internal rotation.) present. No swelling.      Cervical back: Neck supple.      Right lower leg: No edema.      Left lower leg: No edema.   Skin:     General: Skin is warm and dry.      Capillary Refill: Capillary refill takes less than 2 seconds.   Neurological:      Mental Status: She is alert and oriented to person, place, and time.   Psychiatric:         Mood and Affect: Mood normal.         Behavior: Behavior normal.         Pertinent Laboratory/Diagnostic Studies:    Laboratory Results: I have personally reviewed the pertinent laboratory results/reports     CBC: No results for input(s): \"WBC\", \"RBC\", \"HGB\", \"HCT\", \"MCV\", \"MCH\", \"MCHC\", \"RDW\", \"MPV\", \"PLT\", \"NRBC\", \"NEUTOPHILPCT\", \"LYMPHOPCT\", \"MONOPCT\", \"EOSPCT\", \"BASOPCT\", \"NEUTROABS\", \"LYMPHSABS\", \"MONOSABS\", \"EOSABS\" in the last 8784 hours.  Chemistry Profile: No results for input(s): \"NA\", \"K\", \"CL\", \"CO2\", \"ANIONGAP\", \"BUN\", \"CREATININE\", \"GLUF\", \"GLUC\", \"GLUCOSE\", \"CALCIUM\", \"CORRECTEDCA\", \"MG\", \"PHOS\", \"AST\", \"ALT\", \"ALKPHOS\", \"PROT\", \"BILITOT\", \"EGFR\", \"GFRAA\", \"GFRNONAA\", \"EGFRAA\", \"EGFRNAA\", \"EGFRNONAFA\" in the last " "8784 hours.    Invalid input(s): \"EXTSODIUM\", \"EXTPOTASSIUM\", \"EXTCO2\", \"EXTANIONGAP\", \"EXTBUN\", \"EXTCREAT\", \"EXTGLUBLD\", \"GLU\", \"SLAMBGLUCOSE\", \"EXTCALCIUM\", \"CAADJUST\", \"ALBUMIN\", \"SERUMALBUMIN\", \"EXTEGFR\"    Radiology/Other Diagnostic Testing Results: I have personally reviewed pertinent reports.      Nutrition Assessment and Intervention:     Reviewed food recall journal    Recommended completion of food recall journal    Ordered nutritional assessment labs      Physical Activity Assessment and Intervention:    Activity journal reviewed    Activity journal completion recommended    Exercise capacity assessment recommended    Referral given to exercise professional      Emotional and Mental Well-being, Sleep, Connectedness Assessment and Intervention:    Sleep/stress assessment performed    Depression and anxiety screening performed and reviewed    PHQ-9 or GAD7 performed for initial evaluation or follow-up      Tobacco and Toxic Substance Assessment and Intervention:     Tobacco use screening performed    Alcohol and drug use screening performed        Fransico Paez MD  [facility] INTERNAL MEDICINE [facility]"

## 2024-07-23 NOTE — ASSESSMENT & PLAN NOTE
Patient has been on fenofibrate 145 mg daily  Last lipid panel December 2023 showing elevated triglycerides  We will recheck another lipid panel to determine need for increasing fenofibrate dosage versus starting statin therapy

## 2024-07-23 NOTE — TELEPHONE ENCOUNTER
Please be advised that patients lipid panel has come in, she would like the providers recommendation of starting a statin or increasing the fenofibrate now instead of waiting for 3 month follow up.  Please contact patient after provider recommendation

## 2024-07-25 ENCOUNTER — TELEPHONE (OUTPATIENT)
Dept: INTERNAL MEDICINE CLINIC | Facility: CLINIC | Age: 44
End: 2024-07-25

## 2024-08-18 DIAGNOSIS — E66.9 CLASS 1 OBESITY WITHOUT SERIOUS COMORBIDITY WITH BODY MASS INDEX (BMI) OF 32.0 TO 32.9 IN ADULT, UNSPECIFIED OBESITY TYPE: ICD-10-CM

## 2024-08-19 ENCOUNTER — OFFICE VISIT (OUTPATIENT)
Dept: OBGYN CLINIC | Facility: MEDICAL CENTER | Age: 44
End: 2024-08-19
Payer: COMMERCIAL

## 2024-08-19 VITALS
DIASTOLIC BLOOD PRESSURE: 82 MMHG | HEIGHT: 64 IN | BODY MASS INDEX: 32.61 KG/M2 | WEIGHT: 191 LBS | HEART RATE: 75 BPM | SYSTOLIC BLOOD PRESSURE: 124 MMHG

## 2024-08-19 DIAGNOSIS — M77.8 TENDINITIS OF RIGHT SHOULDER: ICD-10-CM

## 2024-08-19 DIAGNOSIS — M25.511 ACUTE PAIN OF RIGHT SHOULDER: Primary | ICD-10-CM

## 2024-08-19 PROCEDURE — 99244 OFF/OP CNSLTJ NEW/EST MOD 40: CPT | Performed by: ORTHOPAEDIC SURGERY

## 2024-08-19 NOTE — PROGRESS NOTES
[facility] [place]  [address]  [facility] [address]       Majo Frias  [id number]  1980    ORTHOPAEDIC SURGERY OUTPATIENT NOTE  8/19/2024      HISTORY:  43 y.o. female  who presents for initial evaluation of right shoulder pain. RHD. Pain started approximately 8 months ago with out injury or trauma. Pain is localized to the posterior shoulder and upper trapezius region. Pain does not radiate. She has modified her work out routine and tried OTC pain medication with benefit but notes some continued aching pain. Pain is most aggravated with lifting and pulling with RUE. She denies limitations in range of motion or weakness. Denies numbness or paresthesias.         Past Medical History:   Diagnosis Date    Mixed hyperlipidemia     Obesity (BMI 30.0-34.9)     Prediabetes     Thyroid nodule        Past Surgical History:   Procedure Laterality Date    THYROIDECTOMY, PARTIAL Right        Social History     Socioeconomic History    Marital status: Single     Spouse name: Not on file    Number of children: Not on file    Years of education: Not on file    Highest education level: Not on file   Occupational History    Not on file   Tobacco Use    Smoking status: Never     Passive exposure: Never    Smokeless tobacco: Never   Vaping Use    Vaping status: Never Used   Substance and Sexual Activity    Alcohol use: Not Currently    Drug use: Never    Sexual activity: Not Currently   Other Topics Concern    Not on file   Social History Narrative    Not on file     Social Determinants of Health     Financial Resource Strain: Not on file   Food Insecurity: Not on file   Transportation Needs: Not on file   Physical Activity: Not on file   Stress: Not on file   Social Connections: Not on file   Intimate Partner Violence: Not on file   Housing Stability: Not on file       Family History   Problem Relation Age of Onset    Heart disease Mother     Stroke Mother      "Hyperlipidemia Mother     Diabetes Mother     Heart disease Father     Stroke Father     Multiple sclerosis Sister     Breast cancer Paternal Aunt     Breast cancer Paternal Cousin         Patient's Medications   New Prescriptions    No medications on file   Previous Medications    FENOFIBRATE (TRICOR) 145 MG TABLET    Take 1 tablet (145 mg total) by mouth daily    GLUCOSAMINE-CHONDROITIN 500-400 MG TABLET    Take 1 tablet by mouth 3 (three) times a day    MULTIPLE VITAMIN (MULTIVITAMIN) CAPSULE    Take 1 capsule by mouth daily   Modified Medications    No medications on file   Discontinued Medications    No medications on file       Allergies   Allergen Reactions    Penicillins Rash        /82 (BP Location: Left arm, Patient Position: Sitting, Cuff Size: Standard)   Pulse 75   Ht 5' 4\" (1.626 m)   Wt 86.6 kg (191 lb)   BMI 32.79 kg/m²      REVIEW OF SYSTEMS:  Constitutional: Negative.    HEENT: Negative.    Respiratory: Negative.    Skin: Negative.    Neurological: Negative.    Psychiatric/Behavioral: Negative.  Musculoskeletal: Negative except for that mentioned in the HPI.    PHYSICAL EXAM:      RIGHT SHOULDER:    Appearance:   Skin is clean, dry, and intact  No ecchymosis, edema, or erythema noted     Forward flexion:   180 degrees   Abduction:  180 degrees   External rotation at 90 degrees abduction:   90 degrees   Internal rotation at 90 degrees abduction:  90 degrees   External rotation at 0 degrees:   70 degrees   Internal rotation: T7     STRENGTH:  Forward flexion:  5/5   Abduction:  5/5   External rotation:  5/5   Internal rotation:  5/5        Speed test: Negative  Yergason's: Negative   Tender to palpation ACJ (acromioclavicular joint): Negative   Tender to palpation LHB (long head of biceps): Negative  Jacob test: Negative  Bradley test: Negative  Hornblower's: Negative  Lift off: Negative  Belly press: Negative  Bear hug: Negative  External lag sign: Negative  Cross-body adduction: " Negative  Sulcus sign: Negative  Windy's test: Negative  Drop arm test: negative    Radial/median/ulnar nerve intact    <2 sec cap refill      IMAGING:  x-rays of the right shoulder obtained 7/23/24 demonstrate no acute osseous abnormalities.     ASSESSMENT AND PLAN:  43 y.o. female with atraumatic right shoulder pain. Upon review of the right shoulder x-ray, a thorough history and my examination, Majo is presenting with signs and symptoms consistent with tendonitis. Etiology and treatment options discussed, all her questions were addressed. Patient has good range of motion and strength on clinical exam today. She may return to activities as tolerated. Continue with OTC NSAIDs as needed for pain. Follow up if symptoms worsen or fail to improve.       Scribe Attestation      I,:  Kaylie Jacques am acting as a scribe while in the presence of the attending physician.:       I,:  Lizbet Delgado, DO personally performed the services described in this documentation    as scribed in my presence.:

## 2024-08-21 RX ORDER — FENOFIBRATE 145 MG/1
145 TABLET, COATED ORAL DAILY
Qty: 30 TABLET | Refills: 0 | Status: SHIPPED | OUTPATIENT
Start: 2024-08-21 | End: 2024-08-27 | Stop reason: SDUPTHER

## 2024-11-19 ENCOUNTER — OFFICE VISIT (OUTPATIENT)
Dept: DERMATOLOGY | Facility: CLINIC | Age: 44
End: 2024-11-19
Payer: COMMERCIAL

## 2024-11-19 VITALS — BODY MASS INDEX: 33.47 KG/M2 | TEMPERATURE: 96.3 F | WEIGHT: 195 LBS

## 2024-11-19 DIAGNOSIS — L85.3 XEROSIS OF SKIN: ICD-10-CM

## 2024-11-19 DIAGNOSIS — L91.8 SKIN TAGS, MULTIPLE ACQUIRED: Primary | ICD-10-CM

## 2024-11-19 DIAGNOSIS — D22.62 MULTIPLE BENIGN MELANOCYTIC NEVI OF UPPER AND LOWER EXTREMITIES AND TRUNK: ICD-10-CM

## 2024-11-19 DIAGNOSIS — D22.5 MULTIPLE BENIGN MELANOCYTIC NEVI OF UPPER AND LOWER EXTREMITIES AND TRUNK: ICD-10-CM

## 2024-11-19 DIAGNOSIS — D22.61 MULTIPLE BENIGN MELANOCYTIC NEVI OF UPPER AND LOWER EXTREMITIES AND TRUNK: ICD-10-CM

## 2024-11-19 DIAGNOSIS — D22.71 MULTIPLE BENIGN MELANOCYTIC NEVI OF UPPER AND LOWER EXTREMITIES AND TRUNK: ICD-10-CM

## 2024-11-19 DIAGNOSIS — L73.9 FOLLICULITIS: ICD-10-CM

## 2024-11-19 DIAGNOSIS — D22.72 MULTIPLE BENIGN MELANOCYTIC NEVI OF UPPER AND LOWER EXTREMITIES AND TRUNK: ICD-10-CM

## 2024-11-19 PROCEDURE — 99203 OFFICE O/P NEW LOW 30 MIN: CPT | Performed by: DERMATOLOGY

## 2024-11-19 RX ORDER — OMEGA-3/DHA/EPA/FISH OIL 60 MG-90MG
500 CAPSULE ORAL DAILY
COMMUNITY

## 2024-11-19 NOTE — PROGRESS NOTES
"[facility] Note     Patient Name: Majo Frias  Encounter Date: 11/19/24     Have you been cared for by a [facility] Dermatologist in the last 3 years and, if so, which description applies to you?    NO.   I am considered a \"new\" patient and must complete all patient intake questions. I am FEMALE/of child-bearing potential.    REVIEW OF SYSTEMS:  Have you recently had or currently have any of the following? Recent fever or chills? No  Any non-healing wound? No  Are you pregnant or planning to become pregnant? No  Are you currently or planning to be nursing or breast feeding? No   PAST MEDICAL HISTORY:  Have you personally ever had or currently have any of the following?  If \"YES,\" then please provide more detail. Skin cancer (such as Melanoma, Basal Cell Carcinoma, Squamous Cell Carcinoma?  No  Tuberculosis, HIV/AIDS, Hepatitis B or C: No  Radiation Treatment No   HISTORY OF IMMUNOSUPPRESSION:   Do you have a history of any of the following:  Systemic Immunosuppression such as Diabetes, Biologic or Immunotherapy, Chemotherapy, Organ Transplantation, Bone Marrow Transplantation or Prednsione?  No    Answering \"YES\" requires the addition of the dotphrase \"IMMUNOSUPPRESSED\" as the first diagnosis of the patient's visit.   FAMILY HISTORY:  Any \"first degree relatives\" (parent, brother, sister, or child) with the following?    Skin Cancer, Pancreatic or Other Cancer? No   PATIENT EXPERIENCE:    Do you want the Dermatologist to perform a COMPLETE skin exam today including a clinical examination under the \"bra and underwear\" areas?  Yes  If necessary, do we have your permission to call and leave a detailed message on your Preferred Phone number that includes your specific medical information?  Yes      Allergies   Allergen Reactions    Penicillins Rash      Current Outpatient Medications:     fenofibrate (TRICOR) 145 mg tablet, Take 1 tablet (145 mg total) by mouth daily, Disp: 90 tablet, Rfl: 0    " "glucosamine-chondroitin 500-400 MG tablet, Take 1 tablet by mouth 3 (three) times a day, Disp: , Rfl:     Multiple Vitamin (multivitamin) capsule, Take 1 capsule by mouth daily, Disp: , Rfl:           Whom besides the patient is providing clinical information about today's encounter?   NO ADDITIONAL HISTORIAN (patient alone provided history)    Physical Exam and Assessment/Plan by Diagnosis:       Scalp not examined      MULTIPLE MELANOCYTIC NEVI (\"Moles\")  Physical Exam:  Anatomic Location Affected: Trunk and extremities  Morphological Description:  Scattered, round to ovoid, symmetrical-appearing, even bordered, skin colored to dark brown macules/papules  Denies pain, itch, bleeding. No treatments tried. Present for years. Present constantly; no modifying factors which make it worse or better. Denies actively changing or growing moles.      Assessment and Plan:  Based on a thorough discussion of this condition and the management approach to it (including a comprehensive discussion of the known risks, side effects and potential benefits of treatment), the patient (family) agrees to implement the following specific plan:  Reassure benign  Monitor for changes  Use sun protection.  Apply SPF 30 or higher at least three times a day.  Wear sun protecting clothing and hats.     Worrisome signs of skin malignancy discussed, questions answered. Regular self-skin check discussed. Advised to call or return to office if patient notices any spots of concern, rapidly growing/changing lesions, bleeding lesions, non-healing lesions. Advised regular SPF use.       XEROSIS (\"DRY SKIN\")  Physical Exam:  Anatomic Location Affected:  lower extremities  Morphological Description:  diffuse dryness  Pertinent Positives:  Pertinent Negatives:    Additional History of Present Condition:  Patient is using a retinol cream on the legs. She reports that her legs have been extremely dry.    Assessment and Plan:  Based on a thorough discussion " of this condition and the management approach to it (including a comprehensive discussion of the known risks, side effects and potential benefits of treatment), the patient (family) agrees to implement the following specific plan:  Use a thick, gentle moisturizer on the legs.  Use the retinol cream on the back and upper extremities, but NOT on the legs.      FOLLICULITIS  Physical Exam:  Anatomic Location Affected:  chin  Morphological Description:  excessive hair - tweezing leading to folliculitis   Pertinent Positives:  Pertinent Negatives:    Additional History of Present Condition:  patient notes that she has some hairs on the chin that bother her.    Assessment and Plan:  Based on a thorough discussion of this condition and the management approach to it (including a comprehensive discussion of the known risks, side effects and potential benefits of treatment), the patient (family) agrees to implement the following specific plan:  Recommend cosmetic laser hair removal treatment to prevent hair growth and dark patches. Will schedule at [facility]   Don't tweeze the hairs prior to laser hair treatment; only shave the area      SKIN TAGS - irritated due to rubbing against necklaces  Physical Exam:  Anatomic Location Affected:  posterior neck  Morphological Description:  tan papules    Additional History of Present Condition:  Patient has had some of these frozen in the past, but she reports that they have come back.    Assessment and Plan:  Based on a thorough discussion of this condition and the management approach to it (including a comprehensive discussion of the known risks, side effects and potential benefits of treatment), the patient (family) agrees to implement the following specific plan:    PROCEDURE # Removal of skin tags with snip removal   After a thorough discussion of treatment options and risk/benefits/alternatives (including but not limited to local pain, scarring, dyspigmentation,  persistance/recurrence of lesions), verbal and written consent were obtained and the aforementioned lesions were treated on with snip removal after anesthesia with lidocaine with 1:200,000 epinephrine. Electrocautery and aluminum chloride was used for control of minimal bleeding.   TOTAL NUMBER of  3 lesions were treated today on the ANATOMIC LOCATION: neck.              The patient tolerated the procedure well, and verbal after-care instructions were provided.        Scribe Attestation      I,:  Coty Maldonado MA am acting as a scribe while in the presence of the attending physician.:       I,:  Pelon Casanova MD personally performed the services described in this documentation    as scribed in my presence.:

## 2024-12-15 DIAGNOSIS — E66.811 CLASS 1 OBESITY WITHOUT SERIOUS COMORBIDITY WITH BODY MASS INDEX (BMI) OF 32.0 TO 32.9 IN ADULT, UNSPECIFIED OBESITY TYPE: ICD-10-CM

## 2024-12-18 RX ORDER — FENOFIBRATE 145 MG/1
145 TABLET, COATED ORAL DAILY
Qty: 90 TABLET | Refills: 0 | Status: SHIPPED | OUTPATIENT
Start: 2024-12-18

## 2025-03-04 DIAGNOSIS — E66.811 CLASS 1 OBESITY WITHOUT SERIOUS COMORBIDITY WITH BODY MASS INDEX (BMI) OF 32.0 TO 32.9 IN ADULT, UNSPECIFIED OBESITY TYPE: ICD-10-CM

## 2025-03-13 DIAGNOSIS — E78.2 MIXED HYPERLIPIDEMIA: Primary | ICD-10-CM

## 2025-03-13 RX ORDER — FENOFIBRATE 145 MG/1
145 TABLET, COATED ORAL DAILY
Qty: 30 TABLET | Refills: 0 | Status: SHIPPED | OUTPATIENT
Start: 2025-03-13 | End: 2025-03-19

## 2025-03-18 DIAGNOSIS — E66.811 CLASS 1 OBESITY WITHOUT SERIOUS COMORBIDITY WITH BODY MASS INDEX (BMI) OF 32.0 TO 32.9 IN ADULT, UNSPECIFIED OBESITY TYPE: ICD-10-CM

## 2025-03-19 RX ORDER — FENOFIBRATE 145 MG/1
145 TABLET, COATED ORAL DAILY
Qty: 90 TABLET | Refills: 0 | Status: SHIPPED | OUTPATIENT
Start: 2025-03-19

## 2025-04-01 ENCOUNTER — TELEPHONE (OUTPATIENT)
Age: 45
End: 2025-04-01

## 2025-04-01 DIAGNOSIS — Z12.31 SCREENING MAMMOGRAM, ENCOUNTER FOR: Primary | ICD-10-CM

## 2025-04-01 NOTE — TELEPHONE ENCOUNTER
Patient called, requested screening mammogram script. Pt scheduled yearly 4/21/25.     Patient inquired if Bibi would order hormone labs including thyroid, cholesterol & any other recommended. Pt mentions this was discussed at their last appt & intends to  complete prior to 4/21 appt. Okay to call or send [employer] message.

## 2025-04-02 NOTE — TELEPHONE ENCOUNTER
Typically routine screening blood work including thyroid, diabetes, cholesterol is all managed by primary care as those are not chronic conditions that we manage from a specialty GYN standpoint.  I see that her routine internal med provider has already placed orders for some blood work on 3/13 and her internal med provider had ordered thyroid blood work last year, she can reach out to them to see if screening thyroid blood work is appropriate again.  There is no routine screening hormonal labs to order from a GYN standpoint.  Typically hormonal labs are ordered in the setting of symptoms or concerns that I feel diagnostic hormonal testing is needed.  If she has further concerns we can discuss this at her visit.

## 2025-04-02 NOTE — TELEPHONE ENCOUNTER
Outgoing call to patient. Communicated half of providers response to patient prior to patient stating she was in a meeting. Requesting message to be sent to her in [facility] with provider's response. Message sent as requested.

## 2025-04-21 ENCOUNTER — ANNUAL EXAM (OUTPATIENT)
Dept: OBGYN CLINIC | Facility: CLINIC | Age: 45
End: 2025-04-21
Payer: COMMERCIAL

## 2025-04-21 VITALS
BODY MASS INDEX: 33.84 KG/M2 | WEIGHT: 198.2 LBS | HEIGHT: 64 IN | DIASTOLIC BLOOD PRESSURE: 78 MMHG | SYSTOLIC BLOOD PRESSURE: 124 MMHG

## 2025-04-21 DIAGNOSIS — E66.811 CLASS 1 OBESITY WITHOUT SERIOUS COMORBIDITY WITH BODY MASS INDEX (BMI) OF 34.0 TO 34.9 IN ADULT, UNSPECIFIED OBESITY TYPE: ICD-10-CM

## 2025-04-21 DIAGNOSIS — Z01.419 ENCOUNTER FOR GYNECOLOGICAL EXAMINATION (GENERAL) (ROUTINE) WITHOUT ABNORMAL FINDINGS: Primary | ICD-10-CM

## 2025-04-21 PROCEDURE — S0612 ANNUAL GYNECOLOGICAL EXAMINA: HCPCS | Performed by: PHYSICIAN ASSISTANT

## 2025-04-21 NOTE — PROGRESS NOTES
ASSESSMENT & PLAN: Majo Frias is a 44 y.o.  with normal gynecologic exam.    1.  Routine well woman exam done today  2.  Pap and HPV:  The patient's last pap and hpv was .    It was normal.    Pap and cotesting was not done today.    Current ASCCP Guidelines reviewed.   3.  Mammogram for  screen scheduled.  Colon cancer screen recommended to start age 45  4. The following were reviewed in today's visit: breast self exam, use and side effects of OCPs, menopause, adequate intake of calcium and vitamin D, exercise, healthy diet, and age appropriate recommendations regarding screenings and prevention.  5.  Patient had questions regarding going through menopause and hormones which were answered to her satisfaction.  She has no menopausal symptoms but is concerned about weight fluctuation, difficulty losing weight.  Encourage patient lifestyle modification, will continue Noom, may need more calorie restriction as well as incorporating moderate to higher intensity exercise to burn calories she is taking in.  Stressed importance of eating clean, small portions, well-balanced.  Also stressed importance of exercise, also incorporating weighted activity for muscle (increased muscle mass helps calorie burn).  Discussed updating thyroid BW through PCP.  Continued management for cholesterol/conditions with PCP.  Consider discussing weight loss aide injectable to facilitate weight loss (not managed through GYN).  6.discuss BC options as pt is not currently sexually active but may happen in future. Discussed still w/ regular periods, opportunity to get pregnant. Pt considered TYLER vs POP - discussed concern of TYLER use with hyperlipidemia, strong family history of stroke as well as age greater than 35.  Also absorption may be affected if patient starts injectable weight loss medication.  Discussed alternatives such as Mirena IUD which in this nulliparous female would protect the uterine lining decrease risk for  hyperplasia, as well as likely last through her time of menopause as well as likely stop menstruation.  Patient desires to consider options and will reach out if she considers.  Handout on Mirena as well as paperwork to contact insurance to check on coverage were all provided.    RTO 1 year annual exam, to schedule appointment sooner if Mirena IUD is desired.  If she would rather consider progestin only pill for now we will reach out and can discuss via phone.      CC:  Annual Gynecologic Examination    HPI: Majo Frias is a 44 y.o.  who presents for annual gynecologic examination.      She has the following concerns:  difficult losing weight. Following noom and calorie restriction < 2000 cals/day. Not exercising.   Also questions about menopause, hormones. No current menopausal sxs, just difficulty with weight.     She is under treatment for hyperlipidemia, also takes fish oil, MVI.  Monitoring thyroid due to history of partial thyroidectomy.  Prediabetes, increased BMI.  Strong family history of cardiovascular disease/stroke    Patient's last menstrual period was 2025.    Menses frequency: regular once a month  Length of bleedin-6 days  Bleeding quality: average  Pain with menses:  none  Denies irregular bleeding.     Last Mammogram: 2024  Last Colon Cancer Screening: N/A      Health Maintenance:      She does perform regular monthly self breast exams.  Denies breast pain, lump, skin change or nipple discharge.    She feels safe at home.       Patient Active Problem List   Diagnosis    Thyroid nodule    Class 1 obesity without serious comorbidity with body mass index (BMI) of 34.0 to 34.9 in adult    Mixed hyperlipidemia    Prediabetes    Annual physical exam    Physical exam, annual    Fatigue    Right shoulder pain       Past Medical History:   Diagnosis Date    Mixed hyperlipidemia     Obesity (BMI 30.0-34.9)     Prediabetes     Thyroid nodule        Past Surgical History:   Procedure  Laterality Date    THYROIDECTOMY, PARTIAL Right        Past OB/Gyn History:  OB History          0    Para   0    Term   0       0    AB   0    Living   0         SAB   0    IAB   0    Ectopic   0    Multiple   0    Live Births   0                  Pt does not have menstrual issues. .  History of sexually transmitted infection: denies.  History of abnormal pap smears: No .    Patient is not currently sexually active.       Family History   Problem Relation Age of Onset    Heart disease Mother     Stroke Mother     Hyperlipidemia Mother     Diabetes Mother     Heart disease Father     Stroke Father     Multiple sclerosis Sister     Breast cancer Paternal Aunt     Breast cancer Paternal Cousin        Family History of Breast/ Uterine/Ovarian/Colon Cancer:  as above.    Social History:  Social History     Socioeconomic History    Marital status: Single     Spouse name: Not on file    Number of children: Not on file    Years of education: Not on file    Highest education level: Not on file   Occupational History    Not on file   Tobacco Use    Smoking status: Never     Passive exposure: Never    Smokeless tobacco: Never   Vaping Use    Vaping status: Never Used   Substance and Sexual Activity    Alcohol use: Not Currently    Drug use: Never    Sexual activity: Not Currently   Other Topics Concern    Not on file   Social History Narrative    Not on file     Social Drivers of Health     Financial Resource Strain: Not on file   Food Insecurity: Not on file   Transportation Needs: Not on file   Physical Activity: Not on file   Stress: Not on file   Social Connections: Not on file   Intimate Partner Violence: Not on file   Housing Stability: Not on file     Allergies   Allergen Reactions    Penicillins Rash       Current Outpatient Medications:     fenofibrate (TRICOR) 145 mg tablet, Take 1 tablet by mouth once daily, Disp: 90 tablet, Rfl: 0    Multiple Vitamin (multivitamin) capsule, Take 1 capsule by mouth  "daily, Disp: , Rfl:     Omega-3 Fatty Acids (Fish Oil) 500 MG CAPS, Take 500 mg by mouth in the morning, Disp: , Rfl:     Review of Systems:    Review of Systems  Constitutional : weight fluctuation/difficulty losing weight. no fever, feels well, no tiredness  ENT: no ear ache, no loss of hearing, no nosebleeds or nasal discharge, no sore throat or hoarseness.  Cardiovascular: no complaints of slow or fast heart beat, no chest pain, no palpitations, no leg claudication or lower extremity edema.  Respiratory: no complaints of shortness of shortness of breath, no MORTON  Breasts:no complaints of breast pain, breast lump, or nipple discharge  Gastrointestinal: no complaints of abdominal pain, constipation, nausea, vomiting, or diarrhea or bloody stools  Genitourinary : no complaints of dysuria, incontinence, pelvic pain, no dysmenorrhea, vaginal discharge/itching/odor or abnormal vaginal bleeding.  Musculoskeletal: no complaints of arthralgia, no myalgia, no joint swelling or stiffness, no limb pain or swelling.  Integumentary: no complaints of skin rash or lesion, itching or dry skin  Neurological: no complaints of headache, no confusion, no numbness or tingling, no dizziness or fainting  Mental health: denies anxiety, depression or SI    Objective      /78 (BP Location: Left arm, Patient Position: Sitting, Cuff Size: Adult)   Ht 5' 4\" (1.626 m)   Wt 89.9 kg (198 lb 3.2 oz)   LMP 04/17/2025   BMI 34.02 kg/m²     General:   appears stated age, cooperative, alert normal mood and affect.  BMI 34   Neck: normal, supple,trachea midline, no masses   Heart: regular rate and rhythm, S1, S2 normal, no murmur, click, rub or gallop   Lungs: clear to auscultation bilaterally   Breasts: normal appearance, no masses or tenderness, No nipple retraction or dimpling, No nipple discharge or bleeding, No axillary or supraclavicular adenopathy, Normal to palpation without dominant masses   Abdomen: soft, non-tender, without masses " or organomegaly   Vulva: normal female genitalia, no lesions   Vagina: normal vagina, no discharge, exudate, lesion, or erythema   Urethra: normal   Cervix: Normal, no discharge. Nontender.   Uterus: normal size, contour, position, consistency, mobility, non-tender   Adnexa: no mass, fullness, tenderness   Lymphatic palpation of lymph nodes in neck, axilla, groin and/or other locations: no lymphadenopathy or masses noted   Skin normal skin turgor and no rashes.   Psychiatric orientation to person, place, and time: normal. mood and affect: normal

## 2025-06-02 ENCOUNTER — OFFICE VISIT (OUTPATIENT)
Dept: INTERNAL MEDICINE CLINIC | Facility: CLINIC | Age: 45
End: 2025-06-02
Payer: COMMERCIAL

## 2025-06-02 VITALS
BODY MASS INDEX: 32.49 KG/M2 | TEMPERATURE: 98.7 F | WEIGHT: 195 LBS | SYSTOLIC BLOOD PRESSURE: 120 MMHG | HEIGHT: 65 IN | DIASTOLIC BLOOD PRESSURE: 80 MMHG | HEART RATE: 87 BPM | OXYGEN SATURATION: 97 % | RESPIRATION RATE: 16 BRPM

## 2025-06-02 DIAGNOSIS — E78.2 MIXED HYPERLIPIDEMIA: ICD-10-CM

## 2025-06-02 DIAGNOSIS — E66.811 CLASS 1 OBESITY WITHOUT SERIOUS COMORBIDITY WITH BODY MASS INDEX (BMI) OF 34.0 TO 34.9 IN ADULT, UNSPECIFIED OBESITY TYPE: Primary | ICD-10-CM

## 2025-06-02 DIAGNOSIS — E04.1 THYROID NODULE: ICD-10-CM

## 2025-06-02 PROCEDURE — 99214 OFFICE O/P EST MOD 30 MIN: CPT

## 2025-06-02 RX ORDER — FLUTICASONE PROPIONATE 50 MCG
1 SPRAY, SUSPENSION (ML) NASAL DAILY
COMMUNITY

## 2025-06-03 ENCOUNTER — APPOINTMENT (OUTPATIENT)
Dept: LAB | Facility: MEDICAL CENTER | Age: 45
End: 2025-06-03
Payer: COMMERCIAL

## 2025-06-03 DIAGNOSIS — E78.2 MIXED HYPERLIPIDEMIA: ICD-10-CM

## 2025-06-03 DIAGNOSIS — E66.811 CLASS 1 OBESITY WITHOUT SERIOUS COMORBIDITY WITH BODY MASS INDEX (BMI) OF 34.0 TO 34.9 IN ADULT, UNSPECIFIED OBESITY TYPE: ICD-10-CM

## 2025-06-03 LAB
ALBUMIN SERPL BCG-MCNC: 4.1 G/DL (ref 3.5–5)
ALP SERPL-CCNC: 52 U/L (ref 34–104)
ALT SERPL W P-5'-P-CCNC: 10 U/L (ref 7–52)
ANION GAP SERPL CALCULATED.3IONS-SCNC: 9 MMOL/L (ref 4–13)
AST SERPL W P-5'-P-CCNC: 21 U/L (ref 13–39)
BASOPHILS # BLD AUTO: 0.06 THOUSANDS/ÂΜL (ref 0–0.1)
BASOPHILS NFR BLD AUTO: 1 % (ref 0–1)
BILIRUB SERPL-MCNC: 0.6 MG/DL (ref 0.2–1)
BUN SERPL-MCNC: 15 MG/DL (ref 5–25)
CALCIUM SERPL-MCNC: 8.8 MG/DL (ref 8.4–10.2)
CHLORIDE SERPL-SCNC: 103 MMOL/L (ref 96–108)
CHOLEST SERPL-MCNC: 216 MG/DL (ref ?–200)
CO2 SERPL-SCNC: 26 MMOL/L (ref 21–32)
CREAT SERPL-MCNC: 0.77 MG/DL (ref 0.6–1.3)
EOSINOPHIL # BLD AUTO: 0.17 THOUSAND/ÂΜL (ref 0–0.61)
EOSINOPHIL NFR BLD AUTO: 2 % (ref 0–6)
ERYTHROCYTE [DISTWIDTH] IN BLOOD BY AUTOMATED COUNT: 13.5 % (ref 11.6–15.1)
EST. AVERAGE GLUCOSE BLD GHB EST-MCNC: 120 MG/DL
GFR SERPL CREATININE-BSD FRML MDRD: 95 ML/MIN/1.73SQ M
GLUCOSE P FAST SERPL-MCNC: 92 MG/DL (ref 65–99)
HBA1C MFR BLD: 5.8 %
HCT VFR BLD AUTO: 43 % (ref 34.8–46.1)
HDLC SERPL-MCNC: 53 MG/DL
HGB BLD-MCNC: 13.3 G/DL (ref 11.5–15.4)
IMM GRANULOCYTES # BLD AUTO: 0.04 THOUSAND/UL (ref 0–0.2)
IMM GRANULOCYTES NFR BLD AUTO: 1 % (ref 0–2)
LDLC SERPL CALC-MCNC: 140 MG/DL (ref 0–100)
LYMPHOCYTES # BLD AUTO: 3.8 THOUSANDS/ÂΜL (ref 0.6–4.47)
LYMPHOCYTES NFR BLD AUTO: 44 % (ref 14–44)
MCH RBC QN AUTO: 27.4 PG (ref 26.8–34.3)
MCHC RBC AUTO-ENTMCNC: 30.9 G/DL (ref 31.4–37.4)
MCV RBC AUTO: 89 FL (ref 82–98)
MONOCYTES # BLD AUTO: 0.65 THOUSAND/ÂΜL (ref 0.17–1.22)
MONOCYTES NFR BLD AUTO: 7 % (ref 4–12)
NEUTROPHILS # BLD AUTO: 4.02 THOUSANDS/ÂΜL (ref 1.85–7.62)
NEUTS SEG NFR BLD AUTO: 45 % (ref 43–75)
NRBC BLD AUTO-RTO: 0 /100 WBCS
PLATELET # BLD AUTO: 385 THOUSANDS/UL (ref 149–390)
PMV BLD AUTO: 11.3 FL (ref 8.9–12.7)
POTASSIUM SERPL-SCNC: 3.8 MMOL/L (ref 3.5–5.3)
PROT SERPL-MCNC: 7.3 G/DL (ref 6.4–8.4)
RBC # BLD AUTO: 4.86 MILLION/UL (ref 3.81–5.12)
SODIUM SERPL-SCNC: 138 MMOL/L (ref 135–147)
TRIGL SERPL-MCNC: 115 MG/DL (ref ?–150)
TSH SERPL DL<=0.05 MIU/L-ACNC: 2.28 UIU/ML (ref 0.45–4.5)
WBC # BLD AUTO: 8.74 THOUSAND/UL (ref 4.31–10.16)

## 2025-06-03 PROCEDURE — 84443 ASSAY THYROID STIM HORMONE: CPT

## 2025-06-03 PROCEDURE — 80053 COMPREHEN METABOLIC PANEL: CPT

## 2025-06-03 PROCEDURE — 85025 COMPLETE CBC W/AUTO DIFF WBC: CPT

## 2025-06-03 PROCEDURE — 83036 HEMOGLOBIN GLYCOSYLATED A1C: CPT

## 2025-06-03 PROCEDURE — 36415 COLL VENOUS BLD VENIPUNCTURE: CPT

## 2025-06-03 PROCEDURE — 80061 LIPID PANEL: CPT

## 2025-06-03 NOTE — PROGRESS NOTES
INTERNAL MEDICINE FOLLOW-UP OFFICE VISIT  [facility] - [facility] INTERNAL MEDICINE [place]    NAME: Majo Frias  AGE: 44 y.o. SEX: female  : 1980     DATE: 6/3/2025     Assessment and Plan:     1. Class 1 obesity without serious comorbidity with body mass index (BMI) of 34.0 to 34.9 in adult, unspecified obesity type  Assessment & Plan:  BMI 32  Has been trying intensive lifestyle modifications with dieting and exercise, unable to lose significant weight  Unable to take GLP-1 due to family history of thyroid cancer  Unable to give phentermine due to strong family history of early cardiovascular disease  Discussed with her that we should check labs to check for diabetes and other comorbidities  She is a diabetic, we do have the option of doing metformin and Jardiance for weight loss benefit in addition to the glucose control  If she is not diabetic, she is agreeable to start Contrave   Routine labs including A1c, lipid panel, thyroid function ordered  Orders:  -     Hemoglobin A1C; Future; Expected date: Collect anytime  -     TSH, 3rd generation with Free T4 reflex; Future; Expected date: Collect anytime  -     CBC and differential; Future; Expected date: Collect anytime  -     Comprehensive metabolic panel; Future; Expected date: Collect anytime  2. Mixed hyperlipidemia  Assessment & Plan:  Will recheck lipid panel  If LDL is elevated, will switch to statin therapy  Orders:  -     Lipid Panel with Direct LDL reflex  3. Thyroid nodule  Assessment & Plan:  Thyroid nodule noted on thyroid ultrasound last year  Recommendation to check yearly ultrasounds  Thyroid ultrasound ordered  Orders:  -     US thyroid      No follow-ups on file.     Chief Complaint:     Chief Complaint   Patient presents with    Physical Exam        History of Present Illness:     Majo Frias is a 44-year-old female with history of thyroid nodule, hyperlipidemia, obesity who is presenting to the clinic for  "follow-up.  She denies any acute complaints.  She states that she has not been able to lose significant weight despite trying lifestyle modifications.  She states that she uses an júnior to track her calories consumption, she has seen weight management but did not feel supported when she went there.  She has been doing exercise with weightlifting, running, Pilates several times a week.  Despite this she has not lost significant weight.  She would like to discuss weight loss medications.    She endorses a family history of thyroid cancer with several family members having thyroid removed.  Unclear what type of thyroid cancer.    The following portions of the patient's history were reviewed and updated as appropriate: allergies, current medications, past family history, past medical history, past social history, past surgical history and problem list.     Review of Systems:     Review of Systems   Constitutional:  Negative for chills and fever.   HENT:  Negative for ear pain and sore throat.    Eyes:  Negative for pain and visual disturbance.   Respiratory:  Negative for cough and shortness of breath.    Cardiovascular:  Negative for chest pain and palpitations.   Gastrointestinal:  Negative for abdominal pain and vomiting.   Genitourinary:  Negative for dysuria and hematuria.   Musculoskeletal:  Negative for arthralgias and back pain.   Skin:  Negative for color change and rash.   Neurological:  Negative for seizures and syncope.   All other systems reviewed and are negative.       Problem List:   Problem List[1]     Objective:     /80 (BP Location: Left arm, Patient Position: Sitting, Cuff Size: Large)   Pulse 87   Temp 98.7 °F (37.1 °C) (Tympanic)   Resp 16   Ht 5' 5\" (1.651 m)   Wt 88.5 kg (195 lb)   SpO2 97%   BMI 32.45 kg/m²     Physical Exam  Vitals and nursing note reviewed.   Constitutional:       General: She is not in acute distress.     Appearance: She is well-developed. She is obese. She is not " "ill-appearing.   HENT:      Head: Normocephalic and atraumatic.     Eyes:      Conjunctiva/sclera: Conjunctivae normal.       Cardiovascular:      Rate and Rhythm: Normal rate and regular rhythm.      Pulses: Normal pulses.      Heart sounds: Normal heart sounds. No murmur heard.     No friction rub. No gallop.   Pulmonary:      Effort: Pulmonary effort is normal. No respiratory distress.      Breath sounds: Normal breath sounds. No wheezing or rales.   Abdominal:      General: There is no distension.      Palpations: Abdomen is soft.      Tenderness: There is no abdominal tenderness. There is no guarding.     Musculoskeletal:         General: No swelling.      Cervical back: Neck supple.      Right lower leg: No edema.      Left lower leg: No edema.     Skin:     General: Skin is warm and dry.      Capillary Refill: Capillary refill takes less than 2 seconds.     Neurological:      Mental Status: She is alert and oriented to person, place, and time.     Psychiatric:         Mood and Affect: Mood normal.         Behavior: Behavior normal.         Pertinent Laboratory/Diagnostic Studies:    Laboratory Results: I have personally reviewed the pertinent laboratory results/reports     CBC: No results for input(s): \"WBC\", \"RBC\", \"HGB\", \"HCT\", \"MCV\", \"MCH\", \"MCHC\", \"RDW\", \"MPV\", \"PLT\", \"NRBC\", \"NEUTOPHILPCT\", \"LYMPHOPCT\", \"MONOPCT\", \"EOSPCT\", \"BASOPCT\", \"NEUTROABS\", \"LYMPHSABS\", \"MONOSABS\", \"EOSABS\" in the last 8784 hours.  Chemistry Profile: No results for input(s): \"NA\", \"K\", \"CL\", \"CO2\", \"ANIONGAP\", \"BUN\", \"CREATININE\", \"GLUF\", \"GLUC\", \"GLUCOSE\", \"CALCIUM\", \"CORRECTEDCA\", \"MG\", \"PHOS\", \"AST\", \"ALT\", \"ALKPHOS\", \"PROT\", \"BILITOT\", \"EGFR\", \"GFRAA\", \"GFRNONAA\", \"EGFRAA\", \"EGFRNAA\", \"EGFRNONAFA\" in the last 8784 hours.    Invalid input(s): \"EXTSODIUM\", \"EXTPOTASSIUM\", \"EXTCO2\", \"EXTANIONGAP\", \"EXTBUN\", \"EXTCREAT\", \"EXTGLUBLD\", \"GLU\", \"SLAMBGLUCOSE\", \"EXTCALCIUM\", \"CAADJUST\", \"ALBUMIN\", \"SERUMALBUMIN\", " "\"EXTEGFR\"    Radiology/Other Diagnostic Testing Results: Results Review Statement: No pertinent imaging studies reviewed.    Fransico Paez MD  [facility] INTERNAL MEDICINE [place]           [1]   Patient Active Problem List  Diagnosis    Thyroid nodule    Class 1 obesity without serious comorbidity with body mass index (BMI) of 34.0 to 34.9 in adult    Mixed hyperlipidemia    Prediabetes    Annual physical exam    Physical exam, annual    Fatigue    Right shoulder pain     "

## 2025-06-03 NOTE — ASSESSMENT & PLAN NOTE
Thyroid nodule noted on thyroid ultrasound last year  Recommendation to check yearly ultrasounds  Thyroid ultrasound ordered

## 2025-06-03 NOTE — ASSESSMENT & PLAN NOTE
BMI 32  Has been trying intensive lifestyle modifications with dieting and exercise, unable to lose significant weight  Unable to take GLP-1 due to family history of thyroid cancer  Unable to give phentermine due to strong family history of early cardiovascular disease  Discussed with her that we should check labs to check for diabetes and other comorbidities  She is a diabetic, we do have the option of doing metformin and Jardiance for weight loss benefit in addition to the glucose control  If she is not diabetic, she is agreeable to start Contrave   Routine labs including A1c, lipid panel, thyroid function ordered

## 2025-06-04 ENCOUNTER — HOSPITAL ENCOUNTER (OUTPATIENT)
Dept: RADIOLOGY | Facility: MEDICAL CENTER | Age: 45
Discharge: HOME | End: 2025-06-04
Payer: COMMERCIAL

## 2025-06-04 PROCEDURE — 76536 US EXAM OF HEAD AND NECK: CPT

## 2025-06-19 ENCOUNTER — TELEPHONE (OUTPATIENT)
Age: 45
End: 2025-06-19

## 2025-06-19 NOTE — TELEPHONE ENCOUNTER
Patient call about getting lab result for her lab work. Please assist the patient with her lab result. Thank you

## 2025-07-01 ENCOUNTER — TELEPHONE (OUTPATIENT)
Dept: DERMATOLOGY | Facility: CLINIC | Age: 45
End: 2025-07-01

## 2025-07-01 NOTE — TELEPHONE ENCOUNTER
Detail Level: Detailed LVM for PT re:r/s from 12/9 due to change in provider's sched    New apt 11/25@11:30am@[facility]    Arrival 15 min prior to apt  PT can call to r/s if necessary  Change will be reflected in [facility]

## 2025-07-03 ENCOUNTER — HOSPITAL ENCOUNTER (OUTPATIENT)
Dept: MAMMOGRAPHY | Facility: CLINIC | Age: 45
End: 2025-07-03
Payer: COMMERCIAL

## 2025-07-03 VITALS — BODY MASS INDEX: 32.49 KG/M2 | HEIGHT: 65 IN | WEIGHT: 195 LBS

## 2025-07-03 DIAGNOSIS — Z12.31 SCREENING MAMMOGRAM, ENCOUNTER FOR: ICD-10-CM

## 2025-07-03 PROCEDURE — 77063 BREAST TOMOSYNTHESIS BI: CPT

## 2025-07-03 PROCEDURE — 77067 SCR MAMMO BI INCL CAD: CPT
